# Patient Record
Sex: FEMALE | Race: WHITE | NOT HISPANIC OR LATINO
[De-identification: names, ages, dates, MRNs, and addresses within clinical notes are randomized per-mention and may not be internally consistent; named-entity substitution may affect disease eponyms.]

---

## 2017-05-30 ENCOUNTER — APPOINTMENT (OUTPATIENT)
Dept: ORTHOPEDIC SURGERY | Facility: CLINIC | Age: 72
End: 2017-05-30

## 2017-05-30 VITALS
BODY MASS INDEX: 32.79 KG/M2 | HEART RATE: 75 BPM | DIASTOLIC BLOOD PRESSURE: 80 MMHG | SYSTOLIC BLOOD PRESSURE: 143 MMHG | WEIGHT: 167 LBS | HEIGHT: 60 IN

## 2017-05-30 DIAGNOSIS — M17.9 OSTEOARTHRITIS OF KNEE, UNSPECIFIED: ICD-10-CM

## 2017-06-20 ENCOUNTER — OUTPATIENT (OUTPATIENT)
Dept: OUTPATIENT SERVICES | Facility: HOSPITAL | Age: 72
LOS: 1 days | End: 2017-06-20
Payer: MEDICARE

## 2017-06-20 VITALS
SYSTOLIC BLOOD PRESSURE: 120 MMHG | DIASTOLIC BLOOD PRESSURE: 80 MMHG | RESPIRATION RATE: 14 BRPM | HEART RATE: 74 BPM | HEIGHT: 59 IN | WEIGHT: 165.35 LBS | TEMPERATURE: 97 F | OXYGEN SATURATION: 98 %

## 2017-06-20 DIAGNOSIS — Z01.818 ENCOUNTER FOR OTHER PREPROCEDURAL EXAMINATION: ICD-10-CM

## 2017-06-20 DIAGNOSIS — Z98.890 OTHER SPECIFIED POSTPROCEDURAL STATES: Chronic | ICD-10-CM

## 2017-06-20 DIAGNOSIS — M19.90 UNSPECIFIED OSTEOARTHRITIS, UNSPECIFIED SITE: ICD-10-CM

## 2017-06-20 DIAGNOSIS — M17.11 UNILATERAL PRIMARY OSTEOARTHRITIS, RIGHT KNEE: ICD-10-CM

## 2017-06-20 DIAGNOSIS — Z96.659 PRESENCE OF UNSPECIFIED ARTIFICIAL KNEE JOINT: Chronic | ICD-10-CM

## 2017-06-20 DIAGNOSIS — E89.2 POSTPROCEDURAL HYPOPARATHYROIDISM: Chronic | ICD-10-CM

## 2017-06-20 LAB
ALBUMIN SERPL ELPH-MCNC: 4 G/DL — SIGNIFICANT CHANGE UP (ref 3.3–5)
ALLERGY+IMMUNOLOGY DIAG STUDY NOTE: SIGNIFICANT CHANGE UP
ALP SERPL-CCNC: 84 U/L — SIGNIFICANT CHANGE UP (ref 30–120)
ALT FLD-CCNC: 37 U/L DA — SIGNIFICANT CHANGE UP (ref 10–60)
ANION GAP SERPL CALC-SCNC: 5 MMOL/L — SIGNIFICANT CHANGE UP (ref 5–17)
APTT BLD: 31.4 SEC — SIGNIFICANT CHANGE UP (ref 27.5–37.4)
AST SERPL-CCNC: 27 U/L — SIGNIFICANT CHANGE UP (ref 10–40)
BILIRUB SERPL-MCNC: 0.6 MG/DL — SIGNIFICANT CHANGE UP (ref 0.2–1.2)
BUN SERPL-MCNC: 22 MG/DL — SIGNIFICANT CHANGE UP (ref 7–23)
CALCIUM SERPL-MCNC: 9.7 MG/DL — SIGNIFICANT CHANGE UP (ref 8.4–10.5)
CHLORIDE SERPL-SCNC: 103 MMOL/L — SIGNIFICANT CHANGE UP (ref 96–108)
CO2 SERPL-SCNC: 30 MMOL/L — SIGNIFICANT CHANGE UP (ref 22–31)
CREAT SERPL-MCNC: 0.83 MG/DL — SIGNIFICANT CHANGE UP (ref 0.5–1.3)
GLUCOSE SERPL-MCNC: 107 MG/DL — HIGH (ref 70–99)
HCT VFR BLD CALC: 42 % — SIGNIFICANT CHANGE UP (ref 34.5–45)
HGB BLD-MCNC: 14.5 G/DL — SIGNIFICANT CHANGE UP (ref 11.5–15.5)
INR BLD: 0.98 RATIO — SIGNIFICANT CHANGE UP (ref 0.88–1.16)
MCHC RBC-ENTMCNC: 30.8 PG — SIGNIFICANT CHANGE UP (ref 27–34)
MCHC RBC-ENTMCNC: 34.6 GM/DL — SIGNIFICANT CHANGE UP (ref 32–36)
MCV RBC AUTO: 89 FL — SIGNIFICANT CHANGE UP (ref 80–100)
PLATELET # BLD AUTO: 252 K/UL — SIGNIFICANT CHANGE UP (ref 150–400)
POTASSIUM SERPL-MCNC: 5.4 MMOL/L — HIGH (ref 3.5–5.3)
POTASSIUM SERPL-SCNC: 5.4 MMOL/L — HIGH (ref 3.5–5.3)
PROT SERPL-MCNC: 7.7 G/DL — SIGNIFICANT CHANGE UP (ref 6–8.3)
PROTHROM AB SERPL-ACNC: 10.7 SEC — SIGNIFICANT CHANGE UP (ref 9.8–12.7)
RBC # BLD: 4.72 M/UL — SIGNIFICANT CHANGE UP (ref 3.8–5.2)
RBC # FLD: 13.1 % — SIGNIFICANT CHANGE UP (ref 10.3–14.5)
SODIUM SERPL-SCNC: 138 MMOL/L — SIGNIFICANT CHANGE UP (ref 135–145)
WBC # BLD: 8.5 K/UL — SIGNIFICANT CHANGE UP (ref 3.8–10.5)
WBC # FLD AUTO: 8.5 K/UL — SIGNIFICANT CHANGE UP (ref 3.8–10.5)

## 2017-06-20 PROCEDURE — 93010 ELECTROCARDIOGRAM REPORT: CPT | Mod: NC

## 2017-06-20 NOTE — H&P PST ADULT - FAMILY HISTORY
Mother  Still living? No  Family history of pulmonary embolism, Age at diagnosis: Age Unknown Mother  Still living? No  Family history of pulmonary embolism, Age at diagnosis: Age Unknown     Sibling  Still living? No  Family history of multiple myeloma, Age at diagnosis: Age Unknown     Father  Still living? No  Family history of cancer in father, Age at diagnosis: Age Unknown

## 2017-06-20 NOTE — H&P PST ADULT - PSH
S/P knee replacement  left knee 2012  S/P parathyroidectomy  2005 History of bunionectomy of right great toe  2007  S/P carpal tunnel release  left hand 2006  S/P knee replacement  left knee 2012  S/P parathyroidectomy  2005

## 2017-06-20 NOTE — H&P PST ADULT - HISTORY OF PRESENT ILLNESS
This is a 72 y/o female who presents with complaint of progressively worsening right knee pain x several years .Reports intermittent pain and worse pain 9/10 when walking, getting up from sitting position , standing . Takes Motrin PRN with mild relief . scheduled for right knee replacement

## 2017-06-20 NOTE — H&P PST ADULT - NSANTHOSAYNRD_GEN_A_CORE
No. TANK screening performed.  STOP BANG Legend: 0-2 = LOW Risk; 3-4 = INTERMEDIATE Risk; 5-8 = HIGH Risk

## 2017-06-21 LAB
MRSA PCR RESULT.: SIGNIFICANT CHANGE UP
S AUREUS DNA NOSE QL NAA+PROBE: SIGNIFICANT CHANGE UP

## 2017-07-07 RX ORDER — SENNA PLUS 8.6 MG/1
2 TABLET ORAL AT BEDTIME
Qty: 0 | Refills: 0 | Status: DISCONTINUED | OUTPATIENT
Start: 2017-07-10 | End: 2017-07-12

## 2017-07-07 RX ORDER — ONDANSETRON 8 MG/1
4 TABLET, FILM COATED ORAL EVERY 6 HOURS
Qty: 0 | Refills: 0 | Status: DISCONTINUED | OUTPATIENT
Start: 2017-07-10 | End: 2017-07-12

## 2017-07-07 RX ORDER — CELECOXIB 200 MG/1
200 CAPSULE ORAL ONCE
Qty: 0 | Refills: 0 | Status: COMPLETED | OUTPATIENT
Start: 2017-07-10 | End: 2017-07-10

## 2017-07-07 RX ORDER — MAGNESIUM HYDROXIDE 400 MG/1
30 TABLET, CHEWABLE ORAL DAILY
Qty: 0 | Refills: 0 | Status: DISCONTINUED | OUTPATIENT
Start: 2017-07-10 | End: 2017-07-12

## 2017-07-07 RX ORDER — SODIUM CHLORIDE 9 MG/ML
1000 INJECTION, SOLUTION INTRAVENOUS
Qty: 0 | Refills: 0 | Status: DISCONTINUED | OUTPATIENT
Start: 2017-07-10 | End: 2017-07-11

## 2017-07-07 RX ORDER — DOCUSATE SODIUM 100 MG
100 CAPSULE ORAL THREE TIMES A DAY
Qty: 0 | Refills: 0 | Status: DISCONTINUED | OUTPATIENT
Start: 2017-07-10 | End: 2017-07-12

## 2017-07-07 RX ORDER — PANTOPRAZOLE SODIUM 20 MG/1
40 TABLET, DELAYED RELEASE ORAL
Qty: 0 | Refills: 0 | Status: DISCONTINUED | OUTPATIENT
Start: 2017-07-10 | End: 2017-07-12

## 2017-07-07 RX ORDER — POLYETHYLENE GLYCOL 3350 17 G/17G
17 POWDER, FOR SOLUTION ORAL DAILY
Qty: 0 | Refills: 0 | Status: DISCONTINUED | OUTPATIENT
Start: 2017-07-10 | End: 2017-07-12

## 2017-07-07 NOTE — PATIENT PROFILE ADULT. - PSH
History of bunionectomy of right great toe  2007  S/P carpal tunnel release  left hand 2006  S/P knee replacement  left knee 2012  S/P parathyroidectomy  2005 History of bunionectomy of right great toe  2007  S/P carpal tunnel release  left hand 2006,right hand 2007  S/P knee replacement  left knee 2012  S/P parathyroidectomy  2005

## 2017-07-07 NOTE — PATIENT PROFILE ADULT. - FAMILY HISTORY
Mother  Still living? No  Family history of pulmonary embolism, Age at diagnosis: Age Unknown     Sibling  Still living? No  Family history of multiple myeloma, Age at diagnosis: Age Unknown     Father  Still living? No  Family history of cancer in father, Age at diagnosis: Age Unknown

## 2017-07-09 RX ORDER — FAMOTIDINE 10 MG/ML
1 INJECTION INTRAVENOUS
Qty: 0 | Refills: 0 | COMMUNITY
Start: 2017-07-09 | End: 2017-07-10

## 2017-07-10 ENCOUNTER — RESULT REVIEW (OUTPATIENT)
Age: 72
End: 2017-07-10

## 2017-07-10 ENCOUNTER — APPOINTMENT (OUTPATIENT)
Dept: ORTHOPEDIC SURGERY | Facility: HOSPITAL | Age: 72
End: 2017-07-10

## 2017-07-10 ENCOUNTER — INPATIENT (INPATIENT)
Facility: HOSPITAL | Age: 72
LOS: 1 days | Discharge: HOME | DRG: 470 | End: 2017-07-12
Attending: ORTHOPAEDIC SURGERY | Admitting: ORTHOPAEDIC SURGERY
Payer: MEDICARE

## 2017-07-10 VITALS
OXYGEN SATURATION: 100 % | HEART RATE: 77 BPM | DIASTOLIC BLOOD PRESSURE: 65 MMHG | WEIGHT: 166.67 LBS | RESPIRATION RATE: 15 BRPM | TEMPERATURE: 98 F | SYSTOLIC BLOOD PRESSURE: 138 MMHG | HEIGHT: 60 IN

## 2017-07-10 DIAGNOSIS — Z96.659 PRESENCE OF UNSPECIFIED ARTIFICIAL KNEE JOINT: Chronic | ICD-10-CM

## 2017-07-10 DIAGNOSIS — M17.11 UNILATERAL PRIMARY OSTEOARTHRITIS, RIGHT KNEE: ICD-10-CM

## 2017-07-10 DIAGNOSIS — E78.5 HYPERLIPIDEMIA, UNSPECIFIED: ICD-10-CM

## 2017-07-10 DIAGNOSIS — Z98.890 OTHER SPECIFIED POSTPROCEDURAL STATES: Chronic | ICD-10-CM

## 2017-07-10 DIAGNOSIS — I10 ESSENTIAL (PRIMARY) HYPERTENSION: ICD-10-CM

## 2017-07-10 DIAGNOSIS — E89.2 POSTPROCEDURAL HYPOPARATHYROIDISM: Chronic | ICD-10-CM

## 2017-07-10 LAB
ABO RH CONFIRMATION: SIGNIFICANT CHANGE UP
ANION GAP SERPL CALC-SCNC: 7 MMOL/L — SIGNIFICANT CHANGE UP (ref 5–17)
BUN SERPL-MCNC: 16 MG/DL — SIGNIFICANT CHANGE UP (ref 7–23)
CALCIUM SERPL-MCNC: 8.5 MG/DL — SIGNIFICANT CHANGE UP (ref 8.4–10.5)
CHLORIDE SERPL-SCNC: 105 MMOL/L — SIGNIFICANT CHANGE UP (ref 96–108)
CO2 SERPL-SCNC: 26 MMOL/L — SIGNIFICANT CHANGE UP (ref 22–31)
CREAT SERPL-MCNC: 0.94 MG/DL — SIGNIFICANT CHANGE UP (ref 0.5–1.3)
GLUCOSE SERPL-MCNC: 147 MG/DL — HIGH (ref 70–99)
HCT VFR BLD CALC: 35 % — SIGNIFICANT CHANGE UP (ref 34.5–45)
HGB BLD-MCNC: 12 G/DL — SIGNIFICANT CHANGE UP (ref 11.5–15.5)
POTASSIUM SERPL-MCNC: 4.1 MMOL/L — SIGNIFICANT CHANGE UP (ref 3.5–5.3)
POTASSIUM SERPL-SCNC: 4.1 MMOL/L — SIGNIFICANT CHANGE UP (ref 3.5–5.3)
SODIUM SERPL-SCNC: 138 MMOL/L — SIGNIFICANT CHANGE UP (ref 135–145)

## 2017-07-10 PROCEDURE — 85027 COMPLETE CBC AUTOMATED: CPT

## 2017-07-10 PROCEDURE — 86850 RBC ANTIBODY SCREEN: CPT

## 2017-07-10 PROCEDURE — 99223 1ST HOSP IP/OBS HIGH 75: CPT

## 2017-07-10 PROCEDURE — 36415 COLL VENOUS BLD VENIPUNCTURE: CPT

## 2017-07-10 PROCEDURE — 80053 COMPREHEN METABOLIC PANEL: CPT

## 2017-07-10 PROCEDURE — 73562 X-RAY EXAM OF KNEE 3: CPT | Mod: 26,RT

## 2017-07-10 PROCEDURE — 88311 DECALCIFY TISSUE: CPT | Mod: 26

## 2017-07-10 PROCEDURE — 88305 TISSUE EXAM BY PATHOLOGIST: CPT | Mod: 26

## 2017-07-10 PROCEDURE — 27447 TOTAL KNEE ARTHROPLASTY: CPT | Mod: 82,RT

## 2017-07-10 PROCEDURE — 27447 TOTAL KNEE ARTHROPLASTY: CPT | Mod: RT

## 2017-07-10 PROCEDURE — 85730 THROMBOPLASTIN TIME PARTIAL: CPT

## 2017-07-10 PROCEDURE — 87641 MR-STAPH DNA AMP PROBE: CPT

## 2017-07-10 PROCEDURE — 86900 BLOOD TYPING SEROLOGIC ABO: CPT

## 2017-07-10 PROCEDURE — 86901 BLOOD TYPING SEROLOGIC RH(D): CPT

## 2017-07-10 PROCEDURE — 87640 STAPH A DNA AMP PROBE: CPT

## 2017-07-10 PROCEDURE — 93005 ELECTROCARDIOGRAM TRACING: CPT

## 2017-07-10 PROCEDURE — 85610 PROTHROMBIN TIME: CPT

## 2017-07-10 PROCEDURE — G0463: CPT

## 2017-07-10 RX ORDER — APREPITANT 80 MG/1
40 CAPSULE ORAL ONCE
Qty: 0 | Refills: 0 | Status: COMPLETED | OUTPATIENT
Start: 2017-07-10 | End: 2017-07-10

## 2017-07-10 RX ORDER — SIMVASTATIN 20 MG/1
80 TABLET, FILM COATED ORAL AT BEDTIME
Qty: 0 | Refills: 0 | Status: DISCONTINUED | OUTPATIENT
Start: 2017-07-10 | End: 2017-07-12

## 2017-07-10 RX ORDER — ASPIRIN/CALCIUM CARB/MAGNESIUM 324 MG
162 TABLET ORAL EVERY 12 HOURS
Qty: 0 | Refills: 0 | Status: DISCONTINUED | OUTPATIENT
Start: 2017-07-11 | End: 2017-07-12

## 2017-07-10 RX ORDER — LISINOPRIL 2.5 MG/1
40 TABLET ORAL DAILY
Qty: 0 | Refills: 0 | Status: DISCONTINUED | OUTPATIENT
Start: 2017-07-12 | End: 2017-07-12

## 2017-07-10 RX ORDER — TRANEXAMIC ACID 100 MG/ML
1000 INJECTION, SOLUTION INTRAVENOUS ONCE
Qty: 0 | Refills: 0 | Status: COMPLETED | OUTPATIENT
Start: 2017-07-10 | End: 2017-07-10

## 2017-07-10 RX ORDER — TRAMADOL HYDROCHLORIDE 50 MG/1
50 TABLET ORAL EVERY 4 HOURS
Qty: 0 | Refills: 0 | Status: DISCONTINUED | OUTPATIENT
Start: 2017-07-10 | End: 2017-07-12

## 2017-07-10 RX ORDER — ACETAMINOPHEN 500 MG
1000 TABLET ORAL EVERY 6 HOURS
Qty: 0 | Refills: 0 | Status: COMPLETED | OUTPATIENT
Start: 2017-07-10 | End: 2017-07-11

## 2017-07-10 RX ORDER — HYDROMORPHONE HYDROCHLORIDE 2 MG/ML
0.5 INJECTION INTRAMUSCULAR; INTRAVENOUS; SUBCUTANEOUS
Qty: 0 | Refills: 0 | Status: DISCONTINUED | OUTPATIENT
Start: 2017-07-10 | End: 2017-07-12

## 2017-07-10 RX ORDER — SIMVASTATIN 20 MG/1
80 TABLET, FILM COATED ORAL AT BEDTIME
Qty: 0 | Refills: 0 | Status: DISCONTINUED | OUTPATIENT
Start: 2017-07-10 | End: 2017-07-10

## 2017-07-10 RX ORDER — CEFAZOLIN SODIUM 1 G
2000 VIAL (EA) INJECTION EVERY 8 HOURS
Qty: 0 | Refills: 0 | Status: COMPLETED | OUTPATIENT
Start: 2017-07-10 | End: 2017-07-11

## 2017-07-10 RX ORDER — SODIUM CHLORIDE 9 MG/ML
1000 INJECTION, SOLUTION INTRAVENOUS
Qty: 0 | Refills: 0 | Status: DISCONTINUED | OUTPATIENT
Start: 2017-07-10 | End: 2017-07-10

## 2017-07-10 RX ORDER — HYDROMORPHONE HYDROCHLORIDE 2 MG/ML
0.5 INJECTION INTRAMUSCULAR; INTRAVENOUS; SUBCUTANEOUS
Qty: 0 | Refills: 0 | Status: DISCONTINUED | OUTPATIENT
Start: 2017-07-10 | End: 2017-07-10

## 2017-07-10 RX ORDER — ONDANSETRON 8 MG/1
4 TABLET, FILM COATED ORAL ONCE
Qty: 0 | Refills: 0 | Status: DISCONTINUED | OUTPATIENT
Start: 2017-07-10 | End: 2017-07-10

## 2017-07-10 RX ORDER — CELECOXIB 200 MG/1
200 CAPSULE ORAL
Qty: 0 | Refills: 0 | Status: DISCONTINUED | OUTPATIENT
Start: 2017-07-10 | End: 2017-07-12

## 2017-07-10 RX ORDER — TRAMADOL HYDROCHLORIDE 50 MG/1
100 TABLET ORAL EVERY 6 HOURS
Qty: 0 | Refills: 0 | Status: DISCONTINUED | OUTPATIENT
Start: 2017-07-10 | End: 2017-07-12

## 2017-07-10 RX ORDER — CEFAZOLIN SODIUM 1 G
2000 VIAL (EA) INJECTION ONCE
Qty: 0 | Refills: 0 | Status: COMPLETED | OUTPATIENT
Start: 2017-07-10 | End: 2017-07-10

## 2017-07-10 RX ORDER — ACETAMINOPHEN 500 MG
1000 TABLET ORAL EVERY 8 HOURS
Qty: 0 | Refills: 0 | Status: DISCONTINUED | OUTPATIENT
Start: 2017-07-11 | End: 2017-07-12

## 2017-07-10 RX ORDER — ACETAMINOPHEN 500 MG
1000 TABLET ORAL ONCE
Qty: 0 | Refills: 0 | Status: COMPLETED | OUTPATIENT
Start: 2017-07-10 | End: 2017-07-10

## 2017-07-10 RX ADMIN — Medication 1000 MILLIGRAM(S): at 20:45

## 2017-07-10 RX ADMIN — CELECOXIB 200 MILLIGRAM(S): 200 CAPSULE ORAL at 12:14

## 2017-07-10 RX ADMIN — HYDROMORPHONE HYDROCHLORIDE 0.5 MILLIGRAM(S): 2 INJECTION INTRAMUSCULAR; INTRAVENOUS; SUBCUTANEOUS at 17:20

## 2017-07-10 RX ADMIN — HYDROMORPHONE HYDROCHLORIDE 0.5 MILLIGRAM(S): 2 INJECTION INTRAMUSCULAR; INTRAVENOUS; SUBCUTANEOUS at 17:50

## 2017-07-10 RX ADMIN — APREPITANT 40 MILLIGRAM(S): 80 CAPSULE ORAL at 12:15

## 2017-07-10 RX ADMIN — Medication 100 MILLIGRAM(S): at 21:36

## 2017-07-10 RX ADMIN — SODIUM CHLORIDE 100 MILLILITER(S): 9 INJECTION, SOLUTION INTRAVENOUS at 15:59

## 2017-07-10 RX ADMIN — Medication 400 MILLIGRAM(S): at 20:00

## 2017-07-10 RX ADMIN — Medication 100 MILLIGRAM(S): at 21:50

## 2017-07-10 RX ADMIN — SENNA PLUS 2 TABLET(S): 8.6 TABLET ORAL at 21:51

## 2017-07-10 RX ADMIN — SIMVASTATIN 80 MILLIGRAM(S): 20 TABLET, FILM COATED ORAL at 21:51

## 2017-07-10 NOTE — CONSULT NOTE ADULT - SUBJECTIVE AND OBJECTIVE BOX
Patient is 70 yo female with hx of HTN, and HLD presenting S/P right TKR.  Pain well controlled.  Offers no other complaints      Constitutional: No fever, fatigue or weight loss.  Skin: No rash.  Eyes: No recent vision problems or eye pain.  ENT: No congestion, ear pain, or sore throat.  Endocrine: No thyroid problems.  Cardiovascular: No chest pain or palpation.  Respiratory: No cough, shortness of breath, congestion, or wheezing.  Gastrointestinal: No abdominal pain, nausea, vomiting, or diarrhea.  Genitourinary: No dysuria.  Musculoskeletal: No joint swelling.  Neurologic: No headache.      Allergies: No Known Allergies:     Past Medical History:  HLD (hyperlipidemia)    HTN (hypertension)    Osteoarthritis    Sciatica.    Past Surgical History:  History of bunionectomy of right great toe  2007  S/P carpal tunnel release  left hand 2006,right hand 2007  S/P knee replacement  left knee 2012  S/P parathyroidectomy  2005.    Home Medications:   * Patient Currently Takes Medications as of 10-Jul-2017 11:12 documented in Prescription Writer  · 	Pepcid AC Maximum Strength 20 mg oral tablet: Last Dose Taken: 10-Jul-2017 8:00 AM, 1 tab(s) orally 2 times a day(x2 preoperatively )  · 	simvastatin 80 mg oral tablet: Last Dose Taken: 09-Jul-2017 9:00 PM, 1 tab(s) orally once a day (at bedtime)  · 	hydroCHLOROthiazide 12.5 mg oral capsule: Last Dose Taken: 03-Jul-2017 , 1 cap(s) orally once a day  · 	lisinopril 40 mg oral tablet: Last Dose Taken: 09-Jul-2017 9:00 PM, 1 tab(s) orally once a day  · 	Calcium 600+D oral tablet: Last Dose Taken: 03-Jul-2017 ,  orally once a day  · 	aspirin 81 mg oral tablet: Last Dose Taken: 03-Jul-2017 ,  orally         Vital Signs Last 24 Hrs  T(C): 36.6 (10 Jul 2017 15:20), Max: 36.6 (10 Jul 2017 15:20)  T(F): 97.8 (10 Jul 2017 15:20), Max: 97.8 (10 Jul 2017 15:20)  HR: 66 (10 Jul 2017 17:50) (55 - 77)  BP: 105/65 (10 Jul 2017 17:50) (105/65 - 143/63)  BP(mean): --  RR: 15 (10 Jul 2017 17:50) (12 - 18)  SpO2: 100% (10 Jul 2017 17:50) (99% - 100%)      PHYSICAL EXAM-  GENERAL: NAD, well-groomed, well-developed  HEAD:  Atraumatic, Normocephalic  EYES: EOMI, PERRLA, conjunctiva and sclera clear  NECK: Supple, No JVD, Normal thyroid  NERVOUS SYSTEM:  Alert & Oriented X3, Motor Strength 5/5 B/L upper and lower extremities; DTRs 2+ intact and symmetric  CHEST/LUNG: Clear to percussion bilaterally; No rales, rhonchi, wheezing, or rubs  HEART: Regular rate and rhythm; No murmurs, rubs, or gallops  ABDOMEN: Soft, Nontender, Nondistended; Bowel sounds present  EXTREMITIES:  2+ Peripheral Pulses, No clubbing, cyanosis, or edema  SKIN: No rashes or lesions

## 2017-07-10 NOTE — CONSULT NOTE ADULT - ATTENDING COMMENTS
Plan of care was discussed with patient, and  in great details, All questions were answered to their satisfaction.  Seems to understand, and in agreement

## 2017-07-11 ENCOUNTER — TRANSCRIPTION ENCOUNTER (OUTPATIENT)
Age: 72
End: 2017-07-11

## 2017-07-11 LAB
ANION GAP SERPL CALC-SCNC: 7 MMOL/L — SIGNIFICANT CHANGE UP (ref 5–17)
BUN SERPL-MCNC: 15 MG/DL — SIGNIFICANT CHANGE UP (ref 7–23)
CALCIUM SERPL-MCNC: 8.8 MG/DL — SIGNIFICANT CHANGE UP (ref 8.4–10.5)
CHLORIDE SERPL-SCNC: 107 MMOL/L — SIGNIFICANT CHANGE UP (ref 96–108)
CO2 SERPL-SCNC: 26 MMOL/L — SIGNIFICANT CHANGE UP (ref 22–31)
CREAT SERPL-MCNC: 0.98 MG/DL — SIGNIFICANT CHANGE UP (ref 0.5–1.3)
GLUCOSE SERPL-MCNC: 123 MG/DL — HIGH (ref 70–99)
HCT VFR BLD CALC: 35.2 % — SIGNIFICANT CHANGE UP (ref 34.5–45)
HGB BLD-MCNC: 11.8 G/DL — SIGNIFICANT CHANGE UP (ref 11.5–15.5)
MCHC RBC-ENTMCNC: 30.2 PG — SIGNIFICANT CHANGE UP (ref 27–34)
MCHC RBC-ENTMCNC: 33.5 GM/DL — SIGNIFICANT CHANGE UP (ref 32–36)
MCV RBC AUTO: 90.3 FL — SIGNIFICANT CHANGE UP (ref 80–100)
PLATELET # BLD AUTO: 202 K/UL — SIGNIFICANT CHANGE UP (ref 150–400)
POTASSIUM SERPL-MCNC: 5.2 MMOL/L — SIGNIFICANT CHANGE UP (ref 3.5–5.3)
POTASSIUM SERPL-SCNC: 5.2 MMOL/L — SIGNIFICANT CHANGE UP (ref 3.5–5.3)
RBC # BLD: 3.89 M/UL — SIGNIFICANT CHANGE UP (ref 3.8–5.2)
RBC # FLD: 12.9 % — SIGNIFICANT CHANGE UP (ref 10.3–14.5)
SODIUM SERPL-SCNC: 140 MMOL/L — SIGNIFICANT CHANGE UP (ref 135–145)
WBC # BLD: 11 K/UL — HIGH (ref 3.8–10.5)
WBC # FLD AUTO: 11 K/UL — HIGH (ref 3.8–10.5)

## 2017-07-11 PROCEDURE — 99232 SBSQ HOSP IP/OBS MODERATE 35: CPT

## 2017-07-11 RX ORDER — CELECOXIB 200 MG/1
1 CAPSULE ORAL
Qty: 38 | Refills: 0 | OUTPATIENT
Start: 2017-07-11 | End: 2017-07-30

## 2017-07-11 RX ADMIN — Medication 162 MILLIGRAM(S): at 21:41

## 2017-07-11 RX ADMIN — Medication 100 MILLIGRAM(S): at 05:35

## 2017-07-11 RX ADMIN — Medication 1000 MILLIGRAM(S): at 09:06

## 2017-07-11 RX ADMIN — CELECOXIB 200 MILLIGRAM(S): 200 CAPSULE ORAL at 09:06

## 2017-07-11 RX ADMIN — SENNA PLUS 2 TABLET(S): 8.6 TABLET ORAL at 21:41

## 2017-07-11 RX ADMIN — CELECOXIB 200 MILLIGRAM(S): 200 CAPSULE ORAL at 15:33

## 2017-07-11 RX ADMIN — PANTOPRAZOLE SODIUM 40 MILLIGRAM(S): 20 TABLET, DELAYED RELEASE ORAL at 05:35

## 2017-07-11 RX ADMIN — SIMVASTATIN 80 MILLIGRAM(S): 20 TABLET, FILM COATED ORAL at 21:41

## 2017-07-11 RX ADMIN — Medication 400 MILLIGRAM(S): at 02:12

## 2017-07-11 RX ADMIN — Medication 1000 MILLIGRAM(S): at 21:41

## 2017-07-11 RX ADMIN — CELECOXIB 200 MILLIGRAM(S): 200 CAPSULE ORAL at 09:05

## 2017-07-11 RX ADMIN — CELECOXIB 200 MILLIGRAM(S): 200 CAPSULE ORAL at 15:32

## 2017-07-11 RX ADMIN — Medication 100 MILLIGRAM(S): at 21:41

## 2017-07-11 RX ADMIN — Medication 162 MILLIGRAM(S): at 09:04

## 2017-07-11 RX ADMIN — Medication 1000 MILLIGRAM(S): at 12:59

## 2017-07-11 RX ADMIN — Medication 1000 MILLIGRAM(S): at 03:02

## 2017-07-11 RX ADMIN — Medication 100 MILLIGRAM(S): at 13:00

## 2017-07-11 RX ADMIN — Medication 400 MILLIGRAM(S): at 09:05

## 2017-07-11 NOTE — DISCHARGE NOTE ADULT - NS AS ACTIVITY OBS
Do not drive or operate machinery/Walking-Indoors allowed/Walking-Outdoors allowed/Stairs allowed/No Heavy lifting/straining/Showering allowed

## 2017-07-11 NOTE — DISCHARGE NOTE ADULT - PATIENT PORTAL LINK FT
“You can access the [facility] Patient Portal, offered by [facility], by registering with the following website: [email or web address]”

## 2017-07-11 NOTE — DISCHARGE NOTE ADULT - MEDICATION SUMMARY - MEDICATIONS TO STOP TAKING
I will STOP taking the medications listed below when I get home from the hospital:    Pepcid AC Maximum Strength 20 mg oral tablet  -- 1 tab(s) by mouth 2 times a day(x2 preoperatively )

## 2017-07-11 NOTE — OCCUPATIONAL THERAPY INITIAL EVALUATION ADULT - ORIENTATION, REHAB EVAL
Patient educated verbally regarding LE weight bearing status & role of OT. Pt. provided with education folder including functional exercises, TKR education & caregiver guide pamphlet./oriented to person, place, time and situation

## 2017-07-11 NOTE — PROGRESS NOTE ADULT - SUBJECTIVE AND OBJECTIVE BOX
Post Op Day # 1    SUBJECTIVE    70yo Female status post right TKR .   Patient is alert and comfortable.    Pain is controlled with current pain regimen.  Denies nausea, vomiting, chest pain, shortness of breath, abdominal pain or fever.   No new complaints.    OBJECTIVE    Vital Signs Last 24 Hrs  T(C): 36.7 (11 Jul 2017 07:53), Max: 36.7 (11 Jul 2017 07:53)  T(F): 98 (11 Jul 2017 07:53), Max: 98 (11 Jul 2017 07:53)  HR: 60 (11 Jul 2017 07:53) (55 - 77)  BP: 116/68 (11 Jul 2017 07:53) (98/56 - 143/63)  BP(mean): --  RR: 16 (11 Jul 2017 07:53) (12 - 18)  SpO2: 100% (11 Jul 2017 07:53) (99% - 100%)  I&O's Summary    10 Jul 2017 07:01  -  11 Jul 2017 07:00  --------------------------------------------------------  IN: 1400 mL / OUT: 1250 mL / NET: 150 mL        PHYSICAL EXAM    Right knee dressing is clean, dry and intact.   The calf is supple/nontender.   Passive range of motion is acceptable to due postoperative pain.   Sensation to light touch is grossly intact distally.   The lateral cutaneous nerve is intact.   Motor function distally is intact.   No foot drop.   (2+) dorsalis pedis pulse. Capillary refill is less than 2 seconds. No cyanosis.                          11.8   11.0<H> )-----------( 202      ( 11 Jul 2017 07:20 )             35.2   11 Jul 2017 07:20                        12.0   x     )-----------( x        ( 10 Jul 2017 22:54 )             35.0   10 Jul 2017 22:54    11 Jul 2017 07:20    140    |  107    |  15     ----------------------------<  123<H>  5.2     |  26     |  0.98   10 Jul 2017 22:54    138    |  105    |  16     ----------------------------<  147<H>  4.1     |  26     |  0.94     Ca    8.8        11 Jul 2017 07:20  Ca    8.5        10 Jul 2017 22:54        ASSESSMENT AND PLAN  - Orthopedically stable  - DVT prophylaxis: PAS + Ecotrin 162mg PO BID  - Continue physical therapy and occupational therapy  - Weight bearing as tolerated of the right lower extremity with assistance of a walker  - Incentive spirometry encouraged  - Pain control as clinically indicated  - Disposition:  Home

## 2017-07-11 NOTE — OCCUPATIONAL THERAPY INITIAL EVALUATION ADULT - ADDITIONAL COMMENTS
Pt lives in a _______with steps to enter. No steps inside. Pt has a bathtub. Pt owns a Pt lives in a house with 2 steps and a handrail to enter. Bedroom and bathroom main floor, steps to laundry room. Pt has a bathtub with curtain and 2 grab bars. Pt owns a rolling walker and a cane. Pt has a comfort height toilet.  Spouse is retired and can assist patient at home prn.

## 2017-07-11 NOTE — DISCHARGE NOTE ADULT - PROVIDER TOKENS
FREE:[LAST:[Philip],FIRST:[Willie],PHONE:[(   )    -],FAX:[(   )    -],ADDRESS:[[address] [place] [address]  Tel: [phone]  Fax: [phone]]]

## 2017-07-11 NOTE — DISCHARGE NOTE ADULT - HOME CARE AGENCY
[facility] - [phone] or [phone]  Nurse to visit the day after hospital discharge; Physical therapist to follow. Please contact the home care agency at the above phone number if you have not heard from them by 12 noon on the day after your hospital discharge.

## 2017-07-11 NOTE — PROGRESS NOTE ADULT - SUBJECTIVE AND OBJECTIVE BOX
CLOT PREVENTION MEDICATION  Aspirin enteric coated 162mg (2-81mg) twice daily.  PAIN MEDICATION  Acetaminophen 1000mg (Tylenol®)  three times daily.  Celecoxib 200mg twice daily.  Tramadol 50mg & 100 mg   (as needed for pain)  STOMACH PROTECTION and CONSTIPATION MANAGEMENT  Pantoprazole 40mg daily   Docusate 100mg (Colace®) stool softener while taking tramadol  Senna, Bisacodyl, Miralax (as needed for constipation)

## 2017-07-11 NOTE — OCCUPATIONAL THERAPY INITIAL EVALUATION ADULT - ADL RETRAINING, OT EVAL
Patient will dress lower body with minimal assistance, AE as needed in 2-4 sessions. Patient will dress lower body with minimal assistance, AE as needed in 1-3 sessions.

## 2017-07-11 NOTE — OCCUPATIONAL THERAPY INITIAL EVALUATION ADULT - TRANSFER TRAINING, PT EVAL
Patient will transfer to toilet with DME as needed with minimal assistance in 2-4 sessions. Patient will transfer to toilet and tub with DME as needed with supervision in 1-3 sessions.

## 2017-07-11 NOTE — DISCHARGE NOTE ADULT - MEDICATION SUMMARY - MEDICATIONS TO TAKE
I will START or STAY ON the medications listed below when I get home from the hospital:    Rolling walker with 5 inch wheels   -- Dx: s/p right TKR  -- Indication: For equipment    acetaminophen 500 mg oral tablet  -- 2 tab(s) by mouth every 8 hours x 10-14 days  -- Indication: For Pain    traMADol 50 mg oral tablet  -- 1 tab(s) by mouth every 4 hours, As needed,Pain  MDD:6  -- Indication: For Pain    aspirin 81 mg oral delayed release tablet  -- 2 tab(s) by mouth every 12 hours  -- Indication: For DVT prophylaxis    celecoxib 200 mg oral capsule  -- 1 cap(s) by mouth 2 times a day (before meals)  -- Indication: For Pain    lisinopril 40 mg oral tablet  -- 1 tab(s) by mouth once a day  -- Indication: For HTN (hypertension)    simvastatin 80 mg oral tablet  -- 1 tab(s) by mouth once a day (at bedtime)  -- Indication: For HLD (hyperlipidemia)    hydroCHLOROthiazide 12.5 mg oral capsule  -- 1 cap(s) by mouth once a day  -- Indication: For HTN (hypertension)    docusate sodium 100 mg oral capsule  -- 1 cap(s) by mouth 3 times a day, As Needed -for constipation  -- Indication: For constipation    senna oral tablet  -- 2 tab(s) by mouth once a day (at bedtime)  -- Indication: For constipation    pantoprazole 40 mg oral delayed release tablet  -- 1 tab(s) by mouth once a day (before a meal)  -- Indication: For acid reflux    Calcium 600+D oral tablet  --  by mouth once a day  -- Indication: For Supplement

## 2017-07-11 NOTE — DISCHARGE NOTE ADULT - HOSPITAL COURSE
This patient was admitted to [facility] with a history of severe degenerative joint disease of the right knee  Patient went to Pre-Surgical Testing at [facility] and was medically cleared to undergo elective procedure.  No operative or klyer-operative complications arose during patients hospital course.  Patient received antibiotic according to SCIP guidelines for infection prevention.  Ecotrin was given for DVT prophylaxis.  Anesthesia, Medical Hospitalist, Physical Therapy and Occupational Therapy were consulted. Patient is stable for discharge with a good prognosis.  Appropriate discharge instructions and medications are provided in this document. This patient was admitted to [facility] with a history of severe degenerative joint disease of the right knee  Patient went to Pre-Surgical Testing at [facility] and was medically cleared to undergo elective procedure.  Right TKR performed on 7/10/17 by . No operative or kyler-operative complications arose during patients hospital course.  Patient received antibiotic according to SCIP guidelines for infection prevention.  Ecotrin was given for DVT prophylaxis.  Anesthesia, Medical Hospitalist, Physical Therapy and Occupational Therapy were consulted. Patient is stable for discharge with a good prognosis.  Appropriate discharge instructions and medications are provided in this document.

## 2017-07-11 NOTE — OCCUPATIONAL THERAPY INITIAL EVALUATION ADULT - IADL RETRAINING, OT EVAL
Patient will increase standing tolerance to 5-7 minutes for grooming at the sink in 2-4 sessions. Patient will increase standing tolerance to 5-7 minutes for grooming at the sink in 1-3 sessions.

## 2017-07-11 NOTE — OCCUPATIONAL THERAPY INITIAL EVALUATION ADULT - GENERAL OBSERVATIONS, REHAB EVAL
Pt found supine in bed with +IV, PAS, +bandage, supplemental 02 Pt found supine in bed with +IV, PAS, +bandage right LE, supplemental 02.

## 2017-07-11 NOTE — PROGRESS NOTE ADULT - SUBJECTIVE AND OBJECTIVE BOX
Post-Anesthetic Evaluation:    The Patient was interviewed and evaluated    Vital Signs Last 24 Hrs  T(C): 36.7 (11 Jul 2017 07:53), Max: 36.7 (11 Jul 2017 07:53)  T(F): 98 (11 Jul 2017 07:53), Max: 98 (11 Jul 2017 07:53)  HR: 60 (11 Jul 2017 07:53) (55 - 77)  BP: 116/68 (11 Jul 2017 07:53) (98/56 - 143/63)  BP(mean): --  RR: 16 (11 Jul 2017 07:53) (12 - 18)  SpO2: 100% (11 Jul 2017 07:53) (99% - 100%)    Evaluation:      (X) No apparent complications or complaints regarding anesthesia care at this time  (X) All questions were answered    Condition:  (X) Stable      ( ) Guarded      ( ) Critical    Recommendations:  (X) None     ( ) Other:

## 2017-07-11 NOTE — DISCHARGE NOTE ADULT - CARE PROVIDER_API CALL
Willie Palacios  [address] [place] [address],   [address]  [place] [address]  Tel: [phone]  Fax: [phone]  Phone: (   )    -  Fax: (   )    -

## 2017-07-11 NOTE — DISCHARGE NOTE ADULT - INSTRUCTIONS
None None    For Constipation :   • Increase your water intake. Drink at least 8 glasses of water daily.  • Try adding fiber to your diet by eating fruits, vegetables and foods that are rich in grains.  • If you do experience constipation, you may take an over-the-counter stool softener/laxative such as Jil Colace, Sen[place] or  Milk of Magnesia.

## 2017-07-12 VITALS
RESPIRATION RATE: 16 BRPM | OXYGEN SATURATION: 97 % | SYSTOLIC BLOOD PRESSURE: 112 MMHG | TEMPERATURE: 98 F | HEART RATE: 76 BPM | DIASTOLIC BLOOD PRESSURE: 73 MMHG

## 2017-07-12 LAB
ANION GAP SERPL CALC-SCNC: 6 MMOL/L — SIGNIFICANT CHANGE UP (ref 5–17)
BUN SERPL-MCNC: 19 MG/DL — SIGNIFICANT CHANGE UP (ref 7–23)
CALCIUM SERPL-MCNC: 8.5 MG/DL — SIGNIFICANT CHANGE UP (ref 8.4–10.5)
CHLORIDE SERPL-SCNC: 108 MMOL/L — SIGNIFICANT CHANGE UP (ref 96–108)
CO2 SERPL-SCNC: 26 MMOL/L — SIGNIFICANT CHANGE UP (ref 22–31)
CREAT SERPL-MCNC: 0.78 MG/DL — SIGNIFICANT CHANGE UP (ref 0.5–1.3)
GLUCOSE SERPL-MCNC: 90 MG/DL — SIGNIFICANT CHANGE UP (ref 70–99)
HCT VFR BLD CALC: 34 % — LOW (ref 34.5–45)
HGB BLD-MCNC: 12 G/DL — SIGNIFICANT CHANGE UP (ref 11.5–15.5)
MCHC RBC-ENTMCNC: 31.6 PG — SIGNIFICANT CHANGE UP (ref 27–34)
MCHC RBC-ENTMCNC: 35.2 GM/DL — SIGNIFICANT CHANGE UP (ref 32–36)
MCV RBC AUTO: 89.9 FL — SIGNIFICANT CHANGE UP (ref 80–100)
PLATELET # BLD AUTO: 180 K/UL — SIGNIFICANT CHANGE UP (ref 150–400)
POTASSIUM SERPL-MCNC: 4.1 MMOL/L — SIGNIFICANT CHANGE UP (ref 3.5–5.3)
POTASSIUM SERPL-SCNC: 4.1 MMOL/L — SIGNIFICANT CHANGE UP (ref 3.5–5.3)
RBC # BLD: 3.78 M/UL — LOW (ref 3.8–5.2)
RBC # FLD: 13.3 % — SIGNIFICANT CHANGE UP (ref 10.3–14.5)
SODIUM SERPL-SCNC: 140 MMOL/L — SIGNIFICANT CHANGE UP (ref 135–145)
WBC # BLD: 9.5 K/UL — SIGNIFICANT CHANGE UP (ref 3.8–10.5)
WBC # FLD AUTO: 9.5 K/UL — SIGNIFICANT CHANGE UP (ref 3.8–10.5)

## 2017-07-12 PROCEDURE — C1776: CPT

## 2017-07-12 PROCEDURE — 88305 TISSUE EXAM BY PATHOLOGIST: CPT

## 2017-07-12 PROCEDURE — C1713: CPT

## 2017-07-12 PROCEDURE — 97530 THERAPEUTIC ACTIVITIES: CPT

## 2017-07-12 PROCEDURE — 97116 GAIT TRAINING THERAPY: CPT

## 2017-07-12 PROCEDURE — 36415 COLL VENOUS BLD VENIPUNCTURE: CPT

## 2017-07-12 PROCEDURE — C1889: CPT

## 2017-07-12 PROCEDURE — 97110 THERAPEUTIC EXERCISES: CPT

## 2017-07-12 PROCEDURE — 80048 BASIC METABOLIC PNL TOTAL CA: CPT

## 2017-07-12 PROCEDURE — 97535 SELF CARE MNGMENT TRAINING: CPT

## 2017-07-12 PROCEDURE — 88311 DECALCIFY TISSUE: CPT

## 2017-07-12 PROCEDURE — 85027 COMPLETE CBC AUTOMATED: CPT

## 2017-07-12 PROCEDURE — 85018 HEMOGLOBIN: CPT

## 2017-07-12 PROCEDURE — 97165 OT EVAL LOW COMPLEX 30 MIN: CPT

## 2017-07-12 PROCEDURE — 97161 PT EVAL LOW COMPLEX 20 MIN: CPT

## 2017-07-12 PROCEDURE — 99238 HOSP IP/OBS DSCHRG MGMT 30/<: CPT

## 2017-07-12 PROCEDURE — 73562 X-RAY EXAM OF KNEE 3: CPT

## 2017-07-12 RX ORDER — ASPIRIN/CALCIUM CARB/MAGNESIUM 324 MG
2 TABLET ORAL
Qty: 160 | Refills: 0 | OUTPATIENT
Start: 2017-07-12 | End: 2017-08-21

## 2017-07-12 RX ORDER — ASPIRIN/CALCIUM CARB/MAGNESIUM 324 MG
0 TABLET ORAL
Qty: 0 | Refills: 0 | COMMUNITY

## 2017-07-12 RX ORDER — ACETAMINOPHEN 500 MG
2 TABLET ORAL
Qty: 0 | Refills: 0 | COMMUNITY
Start: 2017-07-12

## 2017-07-12 RX ORDER — PANTOPRAZOLE SODIUM 20 MG/1
1 TABLET, DELAYED RELEASE ORAL
Qty: 30 | Refills: 1 | OUTPATIENT
Start: 2017-07-12 | End: 2017-09-09

## 2017-07-12 RX ORDER — DOCUSATE SODIUM 100 MG
1 CAPSULE ORAL
Qty: 90 | Refills: 0 | OUTPATIENT
Start: 2017-07-12 | End: 2017-08-11

## 2017-07-12 RX ORDER — SENNA PLUS 8.6 MG/1
2 TABLET ORAL
Qty: 0 | Refills: 0 | COMMUNITY
Start: 2017-07-12

## 2017-07-12 RX ORDER — TRAMADOL HYDROCHLORIDE 50 MG/1
1 TABLET ORAL
Qty: 80 | Refills: 0 | OUTPATIENT
Start: 2017-07-12

## 2017-07-12 RX ORDER — ASPIRIN/CALCIUM CARB/MAGNESIUM 324 MG
1 TABLET ORAL
Qty: 0 | Refills: 0 | COMMUNITY
Start: 2017-07-12 | End: 2017-08-21

## 2017-07-12 RX ADMIN — CELECOXIB 200 MILLIGRAM(S): 200 CAPSULE ORAL at 09:30

## 2017-07-12 RX ADMIN — Medication 100 MILLIGRAM(S): at 13:09

## 2017-07-12 RX ADMIN — Medication 162 MILLIGRAM(S): at 08:53

## 2017-07-12 RX ADMIN — Medication 100 MILLIGRAM(S): at 05:28

## 2017-07-12 RX ADMIN — CELECOXIB 200 MILLIGRAM(S): 200 CAPSULE ORAL at 08:53

## 2017-07-12 RX ADMIN — Medication 1000 MILLIGRAM(S): at 13:09

## 2017-07-12 RX ADMIN — TRAMADOL HYDROCHLORIDE 50 MILLIGRAM(S): 50 TABLET ORAL at 08:52

## 2017-07-12 RX ADMIN — PANTOPRAZOLE SODIUM 40 MILLIGRAM(S): 20 TABLET, DELAYED RELEASE ORAL at 05:28

## 2017-07-12 RX ADMIN — TRAMADOL HYDROCHLORIDE 50 MILLIGRAM(S): 50 TABLET ORAL at 09:30

## 2017-07-12 RX ADMIN — Medication 1000 MILLIGRAM(S): at 05:28

## 2017-07-12 NOTE — PROGRESS NOTE ADULT - SUBJECTIVE AND OBJECTIVE BOX
Orthopedic P.A.- POD# 2- s/p Right TKR    Patient alert and comfortable; walked from bathroom with walker with no c/o pain in knee, but states it feels "stiff".  No BM post-op yet. Denies nausea.  Vital Signs Last 24 Hrs  T(C): 36.6 (12 Jul 2017 03:00), Max: 36.9 (11 Jul 2017 19:00)  T(F): 97.8 (12 Jul 2017 03:00), Max: 98.4 (11 Jul 2017 19:00)  HR: 64 (12 Jul 2017 03:00) (60 - 71)  BP: 136/81 (12 Jul 2017 03:00) (112/80 - 136/81)  BP(mean): --  RR: 16 (12 Jul 2017 03:00) (16 - 16)  SpO2: 96% (12 Jul 2017 03:00) (96% - 100%)         I&O's Detail                            LABS:                                 12.0   9.5   )-----------( 180      ( 12 Jul 2017 07:07 )      BMP in testing****             34.0<L>  12 Jul 2017 07:07                                        MEDICATIONS:aluminum hydroxide/magnesium hydroxide/simethicone Suspension 30 milliLiter(s) Oral four times a day PRN  ondansetron Injectable 4 milliGRAM(s) IV Push every 6 hours PRN  pantoprazole    Tablet 40 milliGRAM(s) Oral before breakfast  magnesium hydroxide Suspension 30 milliLiter(s) Oral daily PRN  polyethylene glycol 3350 17 Gram(s) Oral daily PRN  bisacodyl Suppository 10 milliGRAM(s) Rectal daily PRN  senna 2 Tablet(s) Oral at bedtime  docusate sodium 100 milliGRAM(s) Oral three times a day  lisinopril 40 milliGRAM(s) Oral daily  celecoxib 200 milliGRAM(s) Oral two times a day before meals  aspirin enteric coated 162 milliGRAM(s) Oral every 12 hours  HYDROmorphone  Injectable 0.5 milliGRAM(s) IV Push every 3 hours PRN  traMADol 50 milliGRAM(s) Oral every 4 hours PRN  traMADol 100 milliGRAM(s) Oral every 6 hours PRN  acetaminophen   Tablet 1000 milliGRAM(s) Oral every 8 hours  simvastatin 80 milliGRAM(s) Oral at bedtime    Anticoagulation:  aspirin enteric coated 162 milliGRAM(s) Oral every 12 hours      Pain medications:   ondansetron Injectable 4 milliGRAM(s) IV Push every 6 hours PRN  celecoxib 200 milliGRAM(s) Oral two times a day before meals  HYDROmorphone  Injectable 0.5 milliGRAM(s) IV Push every 3 hours PRN  traMADol 50 milliGRAM(s) Oral every 4 hours PRN  traMADol 100 milliGRAM(s) Oral every 6 hours PRN  acetaminophen   Tablet 1000 milliGRAM(s) Oral every 8 hours                                      Physical Exam:  Right knee- Primary surgical bandage removed and sterile compression bandage placed over dry and intact Prineo tape over surgical wound.  Neurovascular grossly intact LE's.  PAS replaced onto LE's.  Calves soft and non-tender.                                                                                                                                                          A/P:  Orthopedically stable.  -Continue pain management with scheduled Celebrex and acetaminophen and prn Tramadol.  -DVT prophylaxis with Ecotrin 162mg po every 12 hrs for 6 weeks.  -Check BMP today.  -Increase ambulation and ROM Right knee with PT/OT and exercises  -Dr. Delvalle for continued medical care.  for medical care  -Discharge planning for Home today or tomorrow.  -Further plan as per attendings.

## 2017-07-12 NOTE — PROGRESS NOTE ADULT - SUBJECTIVE AND OBJECTIVE BOX
Pain well controlled.  Offers no other complaints.       Constitutional: No fever, fatigue or weight loss.  Skin: No rash.  Eyes: No recent vision problems or eye pain.  ENT: No congestion, ear pain, or sore throat.  Endocrine: No thyroid problems.  Cardiovascular: No chest pain or palpation.  Respiratory: No cough, shortness of breath, congestion, or wheezing.  Gastrointestinal: No abdominal pain, nausea, vomiting, or diarrhea.  Genitourinary: No dysuria.  Musculoskeletal: No joint swelling.  Neurologic: No headache.      MEDICATIONS  (STANDING):  pantoprazole    Tablet 40 milliGRAM(s) Oral before breakfast  senna 2 Tablet(s) Oral at bedtime  docusate sodium 100 milliGRAM(s) Oral three times a day  lisinopril 40 milliGRAM(s) Oral daily  celecoxib 200 milliGRAM(s) Oral two times a day before meals  aspirin enteric coated 162 milliGRAM(s) Oral every 12 hours  acetaminophen   Tablet 1000 milliGRAM(s) Oral every 8 hours  simvastatin 80 milliGRAM(s) Oral at bedtime    MEDICATIONS  (PRN):  aluminum hydroxide/magnesium hydroxide/simethicone Suspension 30 milliLiter(s) Oral four times a day PRN Indigestion  ondansetron Injectable 4 milliGRAM(s) IV Push every 6 hours PRN Nausea and/or Vomiting  magnesium hydroxide Suspension 30 milliLiter(s) Oral daily PRN Constipation  polyethylene glycol 3350 17 Gram(s) Oral daily PRN Constipation  bisacodyl Suppository 10 milliGRAM(s) Rectal daily PRN If no bowel movement by postoperative day #2  HYDROmorphone  Injectable 0.5 milliGRAM(s) IV Push every 3 hours PRN Severe Pain (7 - 10)  traMADol 50 milliGRAM(s) Oral every 4 hours PRN Mild Pain (1 - 3)  traMADol 100 milliGRAM(s) Oral every 6 hours PRN Moderate Pain (4 - 6)      Vital Signs Last 24 Hrs  T(C): 36.5 (12 Jul 2017 07:48), Max: 36.9 (11 Jul 2017 19:00)  T(F): 97.7 (12 Jul 2017 07:48), Max: 98.4 (11 Jul 2017 19:00)  HR: 75 (12 Jul 2017 07:48) (61 - 75)  BP: 118/75 (12 Jul 2017 07:48) (112/80 - 136/81)  BP(mean): --  RR: 16 (12 Jul 2017 07:48) (16 - 16)  SpO2: 99% (12 Jul 2017 07:48) (96% - 100%)        PHYSICAL EXAM-  GENERAL: NAD, well-groomed, well-developed  HEAD:  Atraumatic, Normocephalic  EYES: EOMI, PERRLA, conjunctiva and sclera clear  NECK: Supple, No JVD, Normal thyroid  NERVOUS SYSTEM:  Alert & Oriented X3, Motor Strength 5/5 B/L upper and lower extremities; DTRs 2+ intact and symmetric  CHEST/LUNG: Clear to percussion bilaterally; No rales, rhonchi, wheezing, or rubs  HEART: Regular rate and rhythm; No murmurs, rubs, or gallops  ABDOMEN: Soft, Nontender, Nondistended; Bowel sounds present  EXTREMITIES:  2+ Peripheral Pulses, No clubbing, cyanosis, or edema  SKIN: No rashes or lesions                            12.0   9.5   )-----------( 180      ( 12 Jul 2017 07:07 )             34.0     07-12    140  |  108  |  19  ----------------------------<  90  4.1   |  26  |  0.78    Ca    8.5      12 Jul 2017 07:07

## 2017-07-12 NOTE — PROGRESS NOTE ADULT - PROBLEM SELECTOR PLAN 1
S/P Right TKR.  Continue with pain management, DVT proph, and wound care as per ortho.  PT/OT
S/P Right TKR.  Continue with pain management, DVT proph, and wound care as per ortho.  PT/OT

## 2017-07-27 ENCOUNTER — APPOINTMENT (OUTPATIENT)
Dept: ORTHOPEDIC SURGERY | Facility: CLINIC | Age: 72
End: 2017-07-27
Payer: MEDICARE

## 2017-07-27 VITALS
SYSTOLIC BLOOD PRESSURE: 118 MMHG | DIASTOLIC BLOOD PRESSURE: 75 MMHG | HEART RATE: 88 BPM | WEIGHT: 165 LBS | HEIGHT: 60 IN | BODY MASS INDEX: 32.39 KG/M2

## 2017-07-27 PROCEDURE — 73562 X-RAY EXAM OF KNEE 3: CPT | Mod: RT

## 2017-07-27 PROCEDURE — 99024 POSTOP FOLLOW-UP VISIT: CPT

## 2017-07-27 RX ORDER — ACETAMINOPHEN 500 MG/1
TABLET ORAL
Refills: 0 | Status: ACTIVE | COMMUNITY

## 2017-07-27 RX ORDER — DICLOFENAC SODIUM 75 MG/1
75 TABLET, DELAYED RELEASE ORAL
Qty: 60 | Refills: 0 | Status: DISCONTINUED | COMMUNITY
Start: 2017-04-07

## 2017-07-27 NOTE — HISTORY OF PRESENT ILLNESS
[___ Weeks Post Op] : [unfilled] weeks post op [5] : the patient reports pain that is 5/10 in severity [Chills] : no chills [Constipation] : no constipation [Diarrhea] : no diarrhea [Dysuria] : no dysuria [Fever] : no fever [Nausea] : no nausea [Vomiting] : no vomiting [Xray (Date:___)] : [unfilled] Xray -  [Hardware in Good Position] : hardware in good position [No Obvious Fractures] : no obvious fractures [Good Overall Alignment] : good overall alignment [Doing Well] : is doing well [No Sign of Infection] : is showing no signs of infection [Adequate Pain Control] : has adequate pain control [de-identified] : S/p Right TKR DOS 7/10/2017\par Patient is a 71 year-old female being seen today for her first postoperative visit and prineo tape removal. She is status post right total knee replacement performed at [facility] on 7/10/2017. [de-identified] : Patient presents today for first post op visit.  States doing well, receiving in house PT, performing home exercises.  Ambulating with cane.  States feeling tightness.  Icing and elevating, no fevers reported.  Taking tylenol for pain and has tramadol on hand for severe pain. \par Patient was discharged from [facility] to home  on 7/12/2017.  Patient presents with\par her spouse for evaluation.\par She is doing well. Ambulating with a cane. Receiving home physical therapy 2-3 times a week and performing home exercises.\par Patient is using tylenol for analgesic relief on an intermittent basis. Patient reports she is tramadol one or 2 nights as needed for pain\par Patient continues to use enteric-coated aspirin for DVT prophylaxis and prevention until 6 weeks postoperative. [de-identified] : There is no evidence of infection or cellulitis. The right knee incision with prineo tape intact Patient has a stable gait with minimal postoperative limp utilizing her cane.\par The right knee can flex to approximately 100 degrees easily and can extend to approximately 0 degrees.\par There is no patellofemoral crepitus with range of motion. [de-identified] : The pernio tape was removed from the right knee incision. Patient tolerated well.\par The patient was advised of the nature of the healing process. The patient was advised of the necessity for adherence to the DVT prophylaxis protocol utilizing enteric-coated aspirin 162 mg twice daily for the next 4 weeks'.\par Patient will continue with physical therapy and home exercises. I emphasized the need to sit with the leg extended and do isometric exercises to try to promote the knee to come into full extension. I emphasized the need to elevate the right lower extremity above her heart and to apply ice 3-4 times a day for 15-20 minutes to decrease swelling . The patient was given a prescription to start outpatient physical\par Patient was reinstructed with regard to antibiotic Amoxicillin for dental prophylaxis as per ' protocol.\par Patient will continue to use Tylenol and or tramadol for analgesia.\par The patient will make her followup appointment in 6 weeks.\par The patient and her spouse had their questions answered and gave a clear understanding of the instructions. The patient was advised that she may call office if any new problems arises or if they have any questions.

## 2017-07-31 ENCOUNTER — APPOINTMENT (OUTPATIENT)
Dept: ORTHOPEDIC SURGERY | Facility: CLINIC | Age: 72
End: 2017-07-31
Payer: MEDICARE

## 2017-07-31 VITALS
BODY MASS INDEX: 32.39 KG/M2 | HEART RATE: 103 BPM | DIASTOLIC BLOOD PRESSURE: 71 MMHG | WEIGHT: 165 LBS | HEIGHT: 60 IN | SYSTOLIC BLOOD PRESSURE: 113 MMHG

## 2017-07-31 DIAGNOSIS — Z96.652 PRESENCE OF LEFT ARTIFICIAL KNEE JOINT: ICD-10-CM

## 2017-07-31 PROCEDURE — 99024 POSTOP FOLLOW-UP VISIT: CPT

## 2017-07-31 PROCEDURE — 73562 X-RAY EXAM OF KNEE 3: CPT | Mod: RT

## 2017-08-02 ENCOUNTER — CHART COPY (OUTPATIENT)
Age: 72
End: 2017-08-02

## 2017-08-28 ENCOUNTER — APPOINTMENT (OUTPATIENT)
Dept: ORTHOPEDIC SURGERY | Facility: CLINIC | Age: 72
End: 2017-08-28
Payer: MEDICARE

## 2017-08-28 VITALS
BODY MASS INDEX: 32.79 KG/M2 | SYSTOLIC BLOOD PRESSURE: 142 MMHG | HEART RATE: 82 BPM | WEIGHT: 167 LBS | DIASTOLIC BLOOD PRESSURE: 86 MMHG | HEIGHT: 60 IN

## 2017-08-28 PROCEDURE — 99024 POSTOP FOLLOW-UP VISIT: CPT

## 2017-08-28 PROCEDURE — 73562 X-RAY EXAM OF KNEE 3: CPT | Mod: 50

## 2017-10-23 ENCOUNTER — APPOINTMENT (OUTPATIENT)
Dept: ORTHOPEDIC SURGERY | Facility: CLINIC | Age: 72
End: 2017-10-23
Payer: MEDICARE

## 2017-10-23 VITALS
HEIGHT: 60 IN | WEIGHT: 167 LBS | SYSTOLIC BLOOD PRESSURE: 141 MMHG | BODY MASS INDEX: 32.79 KG/M2 | HEART RATE: 80 BPM | DIASTOLIC BLOOD PRESSURE: 82 MMHG

## 2017-10-23 PROCEDURE — 99024 POSTOP FOLLOW-UP VISIT: CPT

## 2017-10-23 PROCEDURE — 73562 X-RAY EXAM OF KNEE 3: CPT | Mod: RT

## 2017-12-26 ENCOUNTER — APPOINTMENT (OUTPATIENT)
Dept: ORTHOPEDIC SURGERY | Facility: CLINIC | Age: 72
End: 2017-12-26
Payer: MEDICARE

## 2017-12-26 VITALS
HEART RATE: 82 BPM | WEIGHT: 167 LBS | HEIGHT: 60 IN | DIASTOLIC BLOOD PRESSURE: 63 MMHG | BODY MASS INDEX: 32.79 KG/M2 | SYSTOLIC BLOOD PRESSURE: 108 MMHG

## 2017-12-26 PROCEDURE — 99213 OFFICE O/P EST LOW 20 MIN: CPT

## 2017-12-26 PROCEDURE — 73562 X-RAY EXAM OF KNEE 3: CPT | Mod: RT

## 2018-02-06 ENCOUNTER — APPOINTMENT (OUTPATIENT)
Dept: ORTHOPEDIC SURGERY | Facility: CLINIC | Age: 73
End: 2018-02-06
Payer: MEDICARE

## 2018-02-06 VITALS
DIASTOLIC BLOOD PRESSURE: 82 MMHG | SYSTOLIC BLOOD PRESSURE: 138 MMHG | HEART RATE: 86 BPM | HEIGHT: 60 IN | BODY MASS INDEX: 32.98 KG/M2 | WEIGHT: 168 LBS

## 2018-02-06 DIAGNOSIS — M25.551 PAIN IN RIGHT HIP: ICD-10-CM

## 2018-02-06 PROCEDURE — 99213 OFFICE O/P EST LOW 20 MIN: CPT

## 2018-05-31 ENCOUNTER — APPOINTMENT (OUTPATIENT)
Dept: ORTHOPEDIC SURGERY | Facility: CLINIC | Age: 73
End: 2018-05-31
Payer: MEDICARE

## 2018-05-31 VITALS — HEART RATE: 82 BPM | SYSTOLIC BLOOD PRESSURE: 141 MMHG | DIASTOLIC BLOOD PRESSURE: 83 MMHG

## 2018-05-31 DIAGNOSIS — M23.91 UNSPECIFIED INTERNAL DERANGEMENT OF RIGHT KNEE: ICD-10-CM

## 2018-05-31 PROCEDURE — 73562 X-RAY EXAM OF KNEE 3: CPT | Mod: RT

## 2018-05-31 PROCEDURE — 99213 OFFICE O/P EST LOW 20 MIN: CPT

## 2018-06-05 ENCOUNTER — FORM ENCOUNTER (OUTPATIENT)
Age: 73
End: 2018-06-05

## 2018-06-06 ENCOUNTER — OUTPATIENT (OUTPATIENT)
Dept: OUTPATIENT SERVICES | Facility: HOSPITAL | Age: 73
LOS: 1 days | End: 2018-06-06
Payer: MEDICARE

## 2018-06-06 ENCOUNTER — APPOINTMENT (OUTPATIENT)
Dept: MRI IMAGING | Facility: CLINIC | Age: 73
End: 2018-06-06
Payer: MEDICARE

## 2018-06-06 DIAGNOSIS — Z00.8 ENCOUNTER FOR OTHER GENERAL EXAMINATION: ICD-10-CM

## 2018-06-06 DIAGNOSIS — Z96.659 PRESENCE OF UNSPECIFIED ARTIFICIAL KNEE JOINT: Chronic | ICD-10-CM

## 2018-06-06 DIAGNOSIS — Z98.890 OTHER SPECIFIED POSTPROCEDURAL STATES: Chronic | ICD-10-CM

## 2018-06-06 DIAGNOSIS — E89.2 POSTPROCEDURAL HYPOPARATHYROIDISM: Chronic | ICD-10-CM

## 2018-06-06 PROCEDURE — 73721 MRI JNT OF LWR EXTRE W/O DYE: CPT

## 2018-06-06 PROCEDURE — 73721 MRI JNT OF LWR EXTRE W/O DYE: CPT | Mod: 26,RT

## 2018-06-12 ENCOUNTER — CHART COPY (OUTPATIENT)
Age: 73
End: 2018-06-12

## 2018-06-19 ENCOUNTER — APPOINTMENT (OUTPATIENT)
Dept: ORTHOPEDIC SURGERY | Facility: CLINIC | Age: 73
End: 2018-06-19
Payer: MEDICARE

## 2018-06-19 VITALS
WEIGHT: 170 LBS | HEIGHT: 60 IN | DIASTOLIC BLOOD PRESSURE: 80 MMHG | SYSTOLIC BLOOD PRESSURE: 145 MMHG | BODY MASS INDEX: 33.38 KG/M2 | HEART RATE: 71 BPM

## 2018-06-19 DIAGNOSIS — M25.561 PAIN IN RIGHT KNEE: ICD-10-CM

## 2018-06-19 DIAGNOSIS — Z96.651 PRESENCE OF RIGHT ARTIFICIAL KNEE JOINT: ICD-10-CM

## 2018-06-19 DIAGNOSIS — M25.361 OTHER INSTABILITY, RIGHT KNEE: ICD-10-CM

## 2018-06-19 PROCEDURE — 99213 OFFICE O/P EST LOW 20 MIN: CPT

## 2018-06-19 RX ORDER — CELECOXIB 200 MG/1
200 CAPSULE ORAL
Qty: 14 | Refills: 0 | Status: DISCONTINUED | COMMUNITY
Start: 2017-08-02 | End: 2018-06-19

## 2018-06-19 RX ORDER — CELECOXIB 200 MG/1
200 CAPSULE ORAL
Qty: 38 | Refills: 0 | Status: DISCONTINUED | COMMUNITY
Start: 2017-07-11 | End: 2018-06-19

## 2018-06-19 RX ORDER — TRAMADOL HYDROCHLORIDE 50 MG/1
50 TABLET, COATED ORAL
Qty: 80 | Refills: 0 | Status: DISCONTINUED | COMMUNITY
Start: 2017-07-12 | End: 2018-06-19

## 2018-09-18 PROBLEM — M19.90 UNSPECIFIED OSTEOARTHRITIS, UNSPECIFIED SITE: Chronic | Status: ACTIVE | Noted: 2017-06-20

## 2018-09-18 PROBLEM — M54.30 SCIATICA, UNSPECIFIED SIDE: Chronic | Status: ACTIVE | Noted: 2017-06-20

## 2018-09-18 PROBLEM — E78.5 HYPERLIPIDEMIA, UNSPECIFIED: Chronic | Status: ACTIVE | Noted: 2017-06-20

## 2018-09-18 PROBLEM — I10 ESSENTIAL (PRIMARY) HYPERTENSION: Chronic | Status: ACTIVE | Noted: 2017-06-20

## 2018-10-18 ENCOUNTER — OUTPATIENT (OUTPATIENT)
Dept: OUTPATIENT SERVICES | Facility: HOSPITAL | Age: 73
LOS: 1 days | End: 2018-10-18
Payer: MEDICARE

## 2018-10-18 VITALS
HEIGHT: 57 IN | OXYGEN SATURATION: 100 % | HEART RATE: 77 BPM | SYSTOLIC BLOOD PRESSURE: 130 MMHG | WEIGHT: 154.32 LBS | RESPIRATION RATE: 14 BRPM | DIASTOLIC BLOOD PRESSURE: 80 MMHG | TEMPERATURE: 98 F

## 2018-10-18 DIAGNOSIS — Z98.890 OTHER SPECIFIED POSTPROCEDURAL STATES: Chronic | ICD-10-CM

## 2018-10-18 DIAGNOSIS — Z96.659 PRESENCE OF UNSPECIFIED ARTIFICIAL KNEE JOINT: Chronic | ICD-10-CM

## 2018-10-18 DIAGNOSIS — Z96.651 PRESENCE OF RIGHT ARTIFICIAL KNEE JOINT: ICD-10-CM

## 2018-10-18 DIAGNOSIS — Z96.651 PRESENCE OF RIGHT ARTIFICIAL KNEE JOINT: Chronic | ICD-10-CM

## 2018-10-18 DIAGNOSIS — E89.2 POSTPROCEDURAL HYPOPARATHYROIDISM: Chronic | ICD-10-CM

## 2018-10-18 DIAGNOSIS — Z01.818 ENCOUNTER FOR OTHER PREPROCEDURAL EXAMINATION: ICD-10-CM

## 2018-10-18 LAB
ALBUMIN SERPL ELPH-MCNC: 4.1 G/DL — SIGNIFICANT CHANGE UP (ref 3.3–5)
ALP SERPL-CCNC: 74 U/L — SIGNIFICANT CHANGE UP (ref 30–120)
ALT FLD-CCNC: 41 U/L DA — SIGNIFICANT CHANGE UP (ref 10–60)
ANION GAP SERPL CALC-SCNC: 3 MMOL/L — LOW (ref 5–17)
APTT BLD: 31.9 SEC — SIGNIFICANT CHANGE UP (ref 27.5–37.4)
AST SERPL-CCNC: 37 U/L — SIGNIFICANT CHANGE UP (ref 10–40)
BILIRUB SERPL-MCNC: 0.7 MG/DL — SIGNIFICANT CHANGE UP (ref 0.2–1.2)
BLD GP AB SCN SERPL QL: SIGNIFICANT CHANGE UP
BUN SERPL-MCNC: 19 MG/DL — SIGNIFICANT CHANGE UP (ref 7–23)
CALCIUM SERPL-MCNC: 9.9 MG/DL — SIGNIFICANT CHANGE UP (ref 8.4–10.5)
CHLORIDE SERPL-SCNC: 102 MMOL/L — SIGNIFICANT CHANGE UP (ref 96–108)
CO2 SERPL-SCNC: 32 MMOL/L — HIGH (ref 22–31)
CREAT SERPL-MCNC: 0.94 MG/DL — SIGNIFICANT CHANGE UP (ref 0.5–1.3)
GLUCOSE SERPL-MCNC: 104 MG/DL — HIGH (ref 70–99)
HCT VFR BLD CALC: 41.1 % — SIGNIFICANT CHANGE UP (ref 34.5–45)
HGB BLD-MCNC: 13.8 G/DL — SIGNIFICANT CHANGE UP (ref 11.5–15.5)
INR BLD: 1.04 RATIO — SIGNIFICANT CHANGE UP (ref 0.88–1.16)
MCHC RBC-ENTMCNC: 30.5 PG — SIGNIFICANT CHANGE UP (ref 27–34)
MCHC RBC-ENTMCNC: 33.6 GM/DL — SIGNIFICANT CHANGE UP (ref 32–36)
MCV RBC AUTO: 90.9 FL — SIGNIFICANT CHANGE UP (ref 80–100)
MRSA PCR RESULT.: SIGNIFICANT CHANGE UP
NRBC # BLD: 0 /100 WBCS — SIGNIFICANT CHANGE UP (ref 0–0)
PLATELET # BLD AUTO: 249 K/UL — SIGNIFICANT CHANGE UP (ref 150–400)
POTASSIUM SERPL-MCNC: 5.1 MMOL/L — SIGNIFICANT CHANGE UP (ref 3.5–5.3)
POTASSIUM SERPL-SCNC: 5.1 MMOL/L — SIGNIFICANT CHANGE UP (ref 3.5–5.3)
PROT SERPL-MCNC: 7.3 G/DL — SIGNIFICANT CHANGE UP (ref 6–8.3)
PROTHROM AB SERPL-ACNC: 11.3 SEC — SIGNIFICANT CHANGE UP (ref 9.8–12.7)
RBC # BLD: 4.52 M/UL — SIGNIFICANT CHANGE UP (ref 3.8–5.2)
RBC # FLD: 13.3 % — SIGNIFICANT CHANGE UP (ref 10.3–14.5)
S AUREUS DNA NOSE QL NAA+PROBE: SIGNIFICANT CHANGE UP
SODIUM SERPL-SCNC: 137 MMOL/L — SIGNIFICANT CHANGE UP (ref 135–145)
WBC # BLD: 6.14 K/UL — SIGNIFICANT CHANGE UP (ref 3.8–10.5)
WBC # FLD AUTO: 6.14 K/UL — SIGNIFICANT CHANGE UP (ref 3.8–10.5)

## 2018-10-18 PROCEDURE — 80053 COMPREHEN METABOLIC PANEL: CPT

## 2018-10-18 PROCEDURE — G0463: CPT

## 2018-10-18 PROCEDURE — 93010 ELECTROCARDIOGRAM REPORT: CPT

## 2018-10-18 PROCEDURE — 86850 RBC ANTIBODY SCREEN: CPT

## 2018-10-18 PROCEDURE — 87640 STAPH A DNA AMP PROBE: CPT

## 2018-10-18 PROCEDURE — 85027 COMPLETE CBC AUTOMATED: CPT

## 2018-10-18 PROCEDURE — 85610 PROTHROMBIN TIME: CPT

## 2018-10-18 PROCEDURE — 86901 BLOOD TYPING SEROLOGIC RH(D): CPT

## 2018-10-18 PROCEDURE — 85730 THROMBOPLASTIN TIME PARTIAL: CPT

## 2018-10-18 PROCEDURE — 86900 BLOOD TYPING SEROLOGIC ABO: CPT

## 2018-10-18 PROCEDURE — 93005 ELECTROCARDIOGRAM TRACING: CPT

## 2018-10-18 NOTE — H&P PST ADULT - HISTORY OF PRESENT ILLNESS
This is a 73 y/o female who had undergone right knee replacement on January 2017 presents with complaint of right knee swelling,stiffness buckling, instability and difficulty walking .patient states that she fell after knee replacement which exacerbated the symptoms . MRI revealed loose implant   . scheduled for revision of right knee replacement on 11/7/18

## 2018-10-18 NOTE — H&P PST ADULT - PSH
History of bunionectomy of right great toe  2007  S/P carpal tunnel release  left hand 2006,right hand 2007  S/P knee replacement  left knee 2012  S/P parathyroidectomy  2005  Status post right knee replacement  2017

## 2018-11-06 ENCOUNTER — TRANSCRIPTION ENCOUNTER (OUTPATIENT)
Age: 73
End: 2018-11-06

## 2018-11-06 RX ORDER — MAGNESIUM HYDROXIDE 400 MG/1
30 TABLET, CHEWABLE ORAL DAILY
Qty: 0 | Refills: 0 | Status: DISCONTINUED | OUTPATIENT
Start: 2018-11-07 | End: 2018-11-08

## 2018-11-06 RX ORDER — DOCUSATE SODIUM 100 MG
100 CAPSULE ORAL THREE TIMES A DAY
Qty: 0 | Refills: 0 | Status: DISCONTINUED | OUTPATIENT
Start: 2018-11-07 | End: 2018-11-08

## 2018-11-06 RX ORDER — POLYETHYLENE GLYCOL 3350 17 G/17G
17 POWDER, FOR SOLUTION ORAL DAILY
Qty: 0 | Refills: 0 | Status: DISCONTINUED | OUTPATIENT
Start: 2018-11-07 | End: 2018-11-08

## 2018-11-06 RX ORDER — ONDANSETRON 8 MG/1
4 TABLET, FILM COATED ORAL EVERY 6 HOURS
Qty: 0 | Refills: 0 | Status: DISCONTINUED | OUTPATIENT
Start: 2018-11-07 | End: 2018-11-08

## 2018-11-06 RX ORDER — PANTOPRAZOLE SODIUM 20 MG/1
40 TABLET, DELAYED RELEASE ORAL DAILY
Qty: 0 | Refills: 0 | Status: DISCONTINUED | OUTPATIENT
Start: 2018-11-07 | End: 2018-11-08

## 2018-11-06 RX ORDER — SODIUM CHLORIDE 9 MG/ML
1000 INJECTION, SOLUTION INTRAVENOUS
Qty: 0 | Refills: 0 | Status: DISCONTINUED | OUTPATIENT
Start: 2018-11-07 | End: 2018-11-08

## 2018-11-06 RX ORDER — SENNA PLUS 8.6 MG/1
2 TABLET ORAL AT BEDTIME
Qty: 0 | Refills: 0 | Status: DISCONTINUED | OUTPATIENT
Start: 2018-11-07 | End: 2018-11-08

## 2018-11-07 ENCOUNTER — INPATIENT (INPATIENT)
Facility: HOSPITAL | Age: 73
LOS: 0 days | Discharge: HOME | DRG: 468 | End: 2018-11-08
Attending: ORTHOPAEDIC SURGERY | Admitting: ORTHOPAEDIC SURGERY
Payer: MEDICARE

## 2018-11-07 ENCOUNTER — APPOINTMENT (OUTPATIENT)
Dept: ORTHOPEDIC SURGERY | Facility: HOSPITAL | Age: 73
End: 2018-11-07

## 2018-11-07 ENCOUNTER — RESULT REVIEW (OUTPATIENT)
Age: 73
End: 2018-11-07

## 2018-11-07 VITALS
DIASTOLIC BLOOD PRESSURE: 68 MMHG | TEMPERATURE: 97 F | HEART RATE: 91 BPM | WEIGHT: 155.21 LBS | SYSTOLIC BLOOD PRESSURE: 138 MMHG | OXYGEN SATURATION: 100 % | RESPIRATION RATE: 20 BRPM | HEIGHT: 60 IN

## 2018-11-07 DIAGNOSIS — Z96.651 PRESENCE OF RIGHT ARTIFICIAL KNEE JOINT: ICD-10-CM

## 2018-11-07 DIAGNOSIS — Z98.890 OTHER SPECIFIED POSTPROCEDURAL STATES: Chronic | ICD-10-CM

## 2018-11-07 DIAGNOSIS — Z01.818 ENCOUNTER FOR OTHER PREPROCEDURAL EXAMINATION: ICD-10-CM

## 2018-11-07 DIAGNOSIS — Z96.651 PRESENCE OF RIGHT ARTIFICIAL KNEE JOINT: Chronic | ICD-10-CM

## 2018-11-07 DIAGNOSIS — Z96.659 PRESENCE OF UNSPECIFIED ARTIFICIAL KNEE JOINT: Chronic | ICD-10-CM

## 2018-11-07 DIAGNOSIS — E89.2 POSTPROCEDURAL HYPOPARATHYROIDISM: Chronic | ICD-10-CM

## 2018-11-07 PROCEDURE — 27486 REVISE/REPLACE KNEE JOINT: CPT | Mod: 82,RT

## 2018-11-07 PROCEDURE — 99223 1ST HOSP IP/OBS HIGH 75: CPT

## 2018-11-07 PROCEDURE — 88305 TISSUE EXAM BY PATHOLOGIST: CPT | Mod: 26

## 2018-11-07 PROCEDURE — 73562 X-RAY EXAM OF KNEE 3: CPT | Mod: 26,RT

## 2018-11-07 PROCEDURE — 88311 DECALCIFY TISSUE: CPT | Mod: 26

## 2018-11-07 PROCEDURE — 27486 REVISE/REPLACE KNEE JOINT: CPT | Mod: RT

## 2018-11-07 PROCEDURE — 88300 SURGICAL PATH GROSS: CPT | Mod: 26

## 2018-11-07 RX ORDER — SODIUM CHLORIDE 9 MG/ML
1000 INJECTION, SOLUTION INTRAVENOUS
Qty: 0 | Refills: 0 | Status: DISCONTINUED | OUTPATIENT
Start: 2018-11-07 | End: 2018-11-07

## 2018-11-07 RX ORDER — APIXABAN 2.5 MG/1
2.5 TABLET, FILM COATED ORAL EVERY 12 HOURS
Qty: 0 | Refills: 0 | Status: DISCONTINUED | OUTPATIENT
Start: 2018-11-08 | End: 2018-11-08

## 2018-11-07 RX ORDER — TRANEXAMIC ACID 100 MG/ML
1000 INJECTION, SOLUTION INTRAVENOUS ONCE
Qty: 0 | Refills: 0 | Status: COMPLETED | OUTPATIENT
Start: 2018-11-07 | End: 2018-11-07

## 2018-11-07 RX ORDER — ACETAMINOPHEN 500 MG
1000 TABLET ORAL EVERY 8 HOURS
Qty: 0 | Refills: 0 | Status: DISCONTINUED | OUTPATIENT
Start: 2018-11-08 | End: 2018-11-08

## 2018-11-07 RX ORDER — ACETAMINOPHEN 500 MG
1000 TABLET ORAL EVERY 6 HOURS
Qty: 0 | Refills: 0 | Status: COMPLETED | OUTPATIENT
Start: 2018-11-07 | End: 2018-11-08

## 2018-11-07 RX ORDER — HYDROMORPHONE HYDROCHLORIDE 2 MG/ML
0.5 INJECTION INTRAMUSCULAR; INTRAVENOUS; SUBCUTANEOUS
Qty: 0 | Refills: 0 | Status: DISCONTINUED | OUTPATIENT
Start: 2018-11-07 | End: 2018-11-07

## 2018-11-07 RX ORDER — ONDANSETRON 8 MG/1
4 TABLET, FILM COATED ORAL ONCE
Qty: 0 | Refills: 0 | Status: DISCONTINUED | OUTPATIENT
Start: 2018-11-07 | End: 2018-11-07

## 2018-11-07 RX ORDER — LISINOPRIL 2.5 MG/1
1 TABLET ORAL
Qty: 0 | Refills: 0 | COMMUNITY

## 2018-11-07 RX ORDER — FENTANYL CITRATE 50 UG/ML
25 INJECTION INTRAVENOUS
Qty: 0 | Refills: 0 | Status: DISCONTINUED | OUTPATIENT
Start: 2018-11-07 | End: 2018-11-07

## 2018-11-07 RX ORDER — LISINOPRIL 2.5 MG/1
40 TABLET ORAL DAILY
Qty: 0 | Refills: 0 | Status: DISCONTINUED | OUTPATIENT
Start: 2018-11-09 | End: 2018-11-08

## 2018-11-07 RX ORDER — CEFAZOLIN SODIUM 1 G
2000 VIAL (EA) INJECTION ONCE
Qty: 0 | Refills: 0 | Status: COMPLETED | OUTPATIENT
Start: 2018-11-07 | End: 2018-11-07

## 2018-11-07 RX ORDER — ATORVASTATIN CALCIUM 80 MG/1
40 TABLET, FILM COATED ORAL AT BEDTIME
Qty: 0 | Refills: 0 | Status: DISCONTINUED | OUTPATIENT
Start: 2018-11-07 | End: 2018-11-08

## 2018-11-07 RX ORDER — SIMVASTATIN 20 MG/1
1 TABLET, FILM COATED ORAL
Qty: 0 | Refills: 0 | COMMUNITY

## 2018-11-07 RX ORDER — HYDROMORPHONE HYDROCHLORIDE 2 MG/ML
0.5 INJECTION INTRAMUSCULAR; INTRAVENOUS; SUBCUTANEOUS
Qty: 0 | Refills: 0 | Status: DISCONTINUED | OUTPATIENT
Start: 2018-11-07 | End: 2018-11-08

## 2018-11-07 RX ORDER — CHLORHEXIDINE GLUCONATE 213 G/1000ML
1 SOLUTION TOPICAL ONCE
Qty: 0 | Refills: 0 | Status: COMPLETED | OUTPATIENT
Start: 2018-11-07 | End: 2018-11-07

## 2018-11-07 RX ORDER — OXYCODONE AND ACETAMINOPHEN 5; 325 MG/1; MG/1
1 TABLET ORAL ONCE
Qty: 0 | Refills: 0 | Status: DISCONTINUED | OUTPATIENT
Start: 2018-11-07 | End: 2018-11-07

## 2018-11-07 RX ORDER — OXYCODONE HYDROCHLORIDE 5 MG/1
10 TABLET ORAL
Qty: 0 | Refills: 0 | Status: DISCONTINUED | OUTPATIENT
Start: 2018-11-07 | End: 2018-11-08

## 2018-11-07 RX ORDER — CEFAZOLIN SODIUM 1 G
2000 VIAL (EA) INJECTION EVERY 8 HOURS
Qty: 0 | Refills: 0 | Status: COMPLETED | OUTPATIENT
Start: 2018-11-07 | End: 2018-11-08

## 2018-11-07 RX ORDER — OXYCODONE HYDROCHLORIDE 5 MG/1
5 TABLET ORAL
Qty: 0 | Refills: 0 | Status: DISCONTINUED | OUTPATIENT
Start: 2018-11-07 | End: 2018-11-08

## 2018-11-07 RX ORDER — CELECOXIB 200 MG/1
200 CAPSULE ORAL
Qty: 0 | Refills: 0 | Status: DISCONTINUED | OUTPATIENT
Start: 2018-11-07 | End: 2018-11-08

## 2018-11-07 RX ADMIN — Medication 100 MILLIGRAM(S): at 21:21

## 2018-11-07 RX ADMIN — ATORVASTATIN CALCIUM 40 MILLIGRAM(S): 80 TABLET, FILM COATED ORAL at 21:20

## 2018-11-07 RX ADMIN — Medication 100 MILLIGRAM(S): at 16:25

## 2018-11-07 RX ADMIN — SENNA PLUS 2 TABLET(S): 8.6 TABLET ORAL at 21:20

## 2018-11-07 RX ADMIN — CELECOXIB 200 MILLIGRAM(S): 200 CAPSULE ORAL at 22:20

## 2018-11-07 RX ADMIN — Medication 1000 MILLIGRAM(S): at 15:15

## 2018-11-07 RX ADMIN — CHLORHEXIDINE GLUCONATE 1 APPLICATION(S): 213 SOLUTION TOPICAL at 06:07

## 2018-11-07 RX ADMIN — Medication 400 MILLIGRAM(S): at 21:20

## 2018-11-07 RX ADMIN — SODIUM CHLORIDE 125 MILLILITER(S): 9 INJECTION, SOLUTION INTRAVENOUS at 14:38

## 2018-11-07 RX ADMIN — CELECOXIB 200 MILLIGRAM(S): 200 CAPSULE ORAL at 21:20

## 2018-11-07 RX ADMIN — Medication 1000 MILLIGRAM(S): at 22:20

## 2018-11-07 RX ADMIN — Medication 400 MILLIGRAM(S): at 14:38

## 2018-11-07 RX ADMIN — Medication 100 MILLIGRAM(S): at 14:38

## 2018-11-07 RX ADMIN — SODIUM CHLORIDE 75 MILLILITER(S): 9 INJECTION, SOLUTION INTRAVENOUS at 06:07

## 2018-11-07 NOTE — PHYSICAL THERAPY INITIAL EVALUATION ADULT - GAIT DEVIATIONS NOTED, PT EVAL
decreased step length/decreased stride length/decreased weight-shifting ability/increased time in double stance/decreased julian

## 2018-11-07 NOTE — OCCUPATIONAL THERAPY INITIAL EVALUATION ADULT - LOWER BODY DRESSING, PREVIOUS LEVEL OF FUNCTION, OT EVAL
patient was playing and hit her head on side of bed, presents with one inch non bleeding laceration above right eyebrow. patient calm and cooperative not crying at triage. independent

## 2018-11-07 NOTE — PROGRESS NOTE ADULT - SUBJECTIVE AND OBJECTIVE BOX
PCP:  Dr. Nicholas    Information Obtained from:  EHR, Physical Chart, Patient at bedside (relevant EHR and Chart information verified with patient)    CC : Patient is a 72y old  Female who presents with a chief complaint of right knee swelling    HPI:  71yo F admitted s/p revision of Right TKR today.  Had right TKR in July 2017, recently developed difficulty with ambulation due to right knee swelling, stiffness, throbbing pain, "buckling".  Had MRI which showed loosening of the implant.  Symptoms started shortly after she suffered a fall after the original knee replacement.       REVIEW OF SYSTEMS:  CONSTITUTIONAL: No fever, weight loss, or fatigue  EYES: No eye pain, visual disturbances, or discharge  ENMT:  No difficulty hearing, tinnitus, vertigo; No sinus or throat pain  NECK: No pain or stiffness  BREASTS: No pain, masses, or nipple discharge  RESPIRATORY: No cough, wheezing, chills or hemoptysis; No shortness of breath  CARDIOVASCULAR: No chest pain, palpitations, dizziness, or leg swelling  GASTROINTESTINAL: No abdominal or epigastric pain. No nausea, vomiting, or hematemesis; No diarrhea or constipation. No melena or hematochezia.  GENITOURINARY: No dysuria, frequency, hematuria, or incontinence  NEUROLOGICAL: No headaches, memory loss, loss of strength, numbness, or tremors  SKIN: No itching, burning, rashes, or lesions   LYMPH NODES: No enlarged glands  ENDOCRINE: No heat or cold intolerance; No hair loss  MUSCULOSKELETAL: No muscle or back pain  PSYCHIATRIC: No depression, anxiety, mood swings, or difficulty sleeping  HEME/LYMPH: No easy bruising, or bleeding gums  ALLERGY AND IMMUNOLOGIC: No hives or eczema    PAST MEDICAL & SURGICAL HISTORY:  Sciatica  HLD (hyperlipidemia)  HTN (hypertension)  Osteoarthritis  Status post right knee replacement: 2017  History of bunionectomy of right great toe: 2007  S/P carpal tunnel release: left hand 2006,right hand 2007  S/P knee replacement: left knee 2012  S/P parathyroidectomy: 2005    SOCIAL HISTORY:  Lives: with spouse  Smoking Hx: None  ETOH Hx: 1-2 drinks per month on average  Illicit Drug Use:  None    Allergies    No Known Allergies    Intolerances    Home Medications:  aspirin 81 mg oral delayed release tablet: 1 tab(s) orally 3 times a week (07 Nov 2018 06:15)  Calcium 600+D oral tablet:  orally once a day (07 Nov 2018 06:15)  famotidine 20 mg oral tablet:  (07 Nov 2018 06:15)  hydroCHLOROthiazide 12.5 mg oral capsule: 1 cap(s) orally once a day (07 Nov 2018 06:15)  lisinopril 40 mg oral tablet: 1 tab(s) orally once a day (07 Nov 2018 06:15)  simvastatin 80 mg oral tablet: 1 tab(s) orally once a day (at bedtime) (07 Nov 2018 06:15)    FAMILY HISTORY:  Family history of cancer in father (Father): esophageal cancer  Family history of multiple myeloma (Sibling): brother  Family history of pulmonary embolism (Mother): CHF    PHYSICAL EXAM:  Vital Signs Last 24 Hrs  T(C): 36.3 (07 Nov 2018 05:57), Max: 36.3 (07 Nov 2018 05:57)  T(F): 97.4 (07 Nov 2018 05:57), Max: 97.4 (07 Nov 2018 05:57)  HR: 91 (07 Nov 2018 05:57) (91 - 91)  BP: 138/68 (07 Nov 2018 05:57) (138/68 - 138/68)  BP(mean): --  RR: 20 (07 Nov 2018 05:57) (20 - 20)  SpO2: 100% (07 Nov 2018 05:57) (100% - 100%)    GENERAL: NAD, well-groomed, well-developed, awake, alert, oriented x 3, fluent and coherent speech  EYES: EOMI, PERRLA, conjunctiva and sclera clear  ENMT: No tonsillar erythema, exudates, or enlargement; Moist mucous membranes, Good dentition, No lesions  NECK: Supple, No JVD, No Cervical LAD, No thyromegaly, No thyroid nodules  NERVOUS SYSTEM:  Good concentration;No facial droop  CHEST WALL: No masses  CHEST/LUNG: Clear to auscultation bilaterally; No rales, rhonchi, wheezing, or rubs  HEART: Regular rate and rhythm; No murmurs, rubs, or gallops  ABDOMEN: Soft, Nontender, Nondistended, Bowel sounds present, No palpable masses or organomegaly, No bruits  EXTREMITIES:  2+ Peripheral Pulses, No clubbing, cyanosis, or edema  INCISION:  Dressing clean/dry/intact     EKG (was personally reviewed): NSR 71 CONSULT NOTE    PCP:  Dr. Nicholas    Information Obtained from:  EHR, Physical Chart, Patient at bedside (relevant EHR and Chart information verified with patient)    CC : Patient is a 72y old  Female who presents with a chief complaint of right knee swelling    HPI:  73yo F admitted s/p revision of Right TKR today.  Had right TKR in July 2017, recently developed difficulty with ambulation due to right knee swelling, stiffness, throbbing pain, "buckling".  Had MRI which showed loosening of the implant.  Symptoms started shortly after she suffered a fall after the original knee replacement.       REVIEW OF SYSTEMS:  CONSTITUTIONAL: No fever, weight loss, or fatigue  EYES: No eye pain, visual disturbances, or discharge  ENMT:  No difficulty hearing, tinnitus, vertigo; No sinus or throat pain  NECK: No pain or stiffness  BREASTS: No pain, masses, or nipple discharge  RESPIRATORY: No cough, wheezing, chills or hemoptysis; No shortness of breath  CARDIOVASCULAR: No chest pain, palpitations, dizziness, or leg swelling  GASTROINTESTINAL: No abdominal or epigastric pain. No nausea, vomiting, or hematemesis; No diarrhea or constipation. No melena or hematochezia.  GENITOURINARY: No dysuria, frequency, hematuria, or incontinence  NEUROLOGICAL: No headaches, memory loss, loss of strength, numbness, or tremors  SKIN: No itching, burning, rashes, or lesions   LYMPH NODES: No enlarged glands  ENDOCRINE: No heat or cold intolerance; No hair loss  MUSCULOSKELETAL: No muscle or back pain  PSYCHIATRIC: No depression, anxiety, mood swings, or difficulty sleeping  HEME/LYMPH: No easy bruising, or bleeding gums  ALLERGY AND IMMUNOLOGIC: No hives or eczema    PAST MEDICAL & SURGICAL HISTORY:  Sciatica  HLD (hyperlipidemia)  HTN (hypertension)  Osteoarthritis  Status post right knee replacement: 2017  History of bunionectomy of right great toe: 2007  S/P carpal tunnel release: left hand 2006,right hand 2007  S/P knee replacement: left knee 2012  S/P parathyroidectomy: 2005    SOCIAL HISTORY:  Lives: with spouse  Smoking Hx: None  ETOH Hx: 1-2 drinks per month on average  Illicit Drug Use:  None    Allergies    No Known Allergies    Intolerances    Home Medications:  aspirin 81 mg oral delayed release tablet: 1 tab(s) orally 3 times a week (07 Nov 2018 06:15)  Calcium 600+D oral tablet:  orally once a day (07 Nov 2018 06:15)  famotidine 20 mg oral tablet:  (07 Nov 2018 06:15)  hydroCHLOROthiazide 12.5 mg oral capsule: 1 cap(s) orally once a day (07 Nov 2018 06:15)  lisinopril 40 mg oral tablet: 1 tab(s) orally once a day (07 Nov 2018 06:15)  simvastatin 80 mg oral tablet: 1 tab(s) orally once a day (at bedtime) (07 Nov 2018 06:15)    FAMILY HISTORY:  Family history of cancer in father (Father): esophageal cancer  Family history of multiple myeloma (Sibling): brother  Family history of pulmonary embolism (Mother): CHF    PHYSICAL EXAM:  Vital Signs Last 24 Hrs  T(C): 36.3 (07 Nov 2018 05:57), Max: 36.3 (07 Nov 2018 05:57)  T(F): 97.4 (07 Nov 2018 05:57), Max: 97.4 (07 Nov 2018 05:57)  HR: 91 (07 Nov 2018 05:57) (91 - 91)  BP: 138/68 (07 Nov 2018 05:57) (138/68 - 138/68)  BP(mean): --  RR: 20 (07 Nov 2018 05:57) (20 - 20)  SpO2: 100% (07 Nov 2018 05:57) (100% - 100%)    GENERAL: NAD, well-groomed, well-developed, awake, alert, oriented x 3, fluent and coherent speech  EYES: EOMI, PERRLA, conjunctiva and sclera clear  ENMT: No tonsillar erythema, exudates, or enlargement; Moist mucous membranes, Good dentition, No lesions  NECK: Supple, No JVD, No Cervical LAD, No thyromegaly, No thyroid nodules  NERVOUS SYSTEM:  Good concentration;No facial droop  CHEST WALL: No masses  CHEST/LUNG: Clear to auscultation bilaterally; No rales, rhonchi, wheezing, or rubs  HEART: Regular rate and rhythm; No murmurs, rubs, or gallops  ABDOMEN: Soft, Nontender, Nondistended, Bowel sounds present, No palpable masses or organomegaly, No bruits  EXTREMITIES:  2+ Peripheral Pulses, No clubbing, cyanosis, or edema  INCISION:  Dressing clean/dry/intact     EKG (was personally reviewed): NSR 71 CONSULT NOTE    PCP:  Dr. Nicohlas    Information Obtained from:  EHR, Physical Chart, Patient at bedside (relevant EHR and Chart information verified with patient)    CC : Patient is a 72y old  Female who presents with a chief complaint of right knee swelling    HPI:  71yo F admitted s/p revision of Right TKR today.  Had right TKR in July 2017, recently developed difficulty with ambulation due to right knee swelling, stiffness, throbbing pain, "buckling".  Had MRI which showed loosening of the implant.  Suffered a fall 2 weeks after initial surgery at home, symptoms started a few months after.    REVIEW OF SYSTEMS:  CONSTITUTIONAL: No fever, weight loss, or fatigue  EYES: No eye pain, visual disturbances, or discharge  ENMT:  No difficulty hearing, tinnitus, vertigo; No sinus or throat pain  NECK: No pain or stiffness  BREASTS: No pain, masses, or nipple discharge  RESPIRATORY: No cough, wheezing, chills or hemoptysis; No shortness of breath  CARDIOVASCULAR: No chest pain, palpitations, dizziness, or leg swelling  GASTROINTESTINAL: No abdominal or epigastric pain. No nausea, vomiting, or hematemesis; No diarrhea or constipation. No melena or hematochezia.  GENITOURINARY: No dysuria, frequency, hematuria, or incontinence  NEUROLOGICAL: No headaches, memory loss, loss of strength, numbness, or tremors  SKIN: No itching, burning, rashes, or lesions   LYMPH NODES: No enlarged glands  ENDOCRINE: No heat or cold intolerance; No hair loss  MUSCULOSKELETAL: No muscle or back pain  PSYCHIATRIC: No depression, anxiety, mood swings, or difficulty sleeping  HEME/LYMPH: No easy bruising, or bleeding gums  ALLERGY AND IMMUNOLOGIC: No hives or eczema    PAST MEDICAL & SURGICAL HISTORY:  Sciatica  HLD (hyperlipidemia)  HTN (hypertension)  Osteoarthritis  Status post right knee replacement: 2017  History of bunionectomy of right great toe: 2007  S/P carpal tunnel release: left hand 2006,right hand 2007  S/P knee replacement: left knee 2012  S/P parathyroidectomy: 2005    SOCIAL HISTORY:  Lives: with spouse  Smoking Hx: None  ETOH Hx: 1-2 drinks per month on average  Illicit Drug Use:  None    Allergies    No Known Allergies    Intolerances    Home Medications:  aspirin 81 mg oral delayed release tablet: 1 tab(s) orally 3 times a week (07 Nov 2018 06:15)  Calcium 600+D oral tablet:  orally once a day (07 Nov 2018 06:15)  famotidine 20 mg oral tablet:  (07 Nov 2018 06:15)  hydroCHLOROthiazide 12.5 mg oral capsule: 1 cap(s) orally once a day (07 Nov 2018 06:15)  lisinopril 40 mg oral tablet: 1 tab(s) orally once a day (07 Nov 2018 06:15)  simvastatin 80 mg oral tablet: 1 tab(s) orally once a day (at bedtime) (07 Nov 2018 06:15)    FAMILY HISTORY:  Family history of cancer in father (Father): esophageal cancer  Family history of multiple myeloma (Sibling): brother  Family history of pulmonary embolism (Mother): CHF    PHYSICAL EXAM:  Vital Signs Last 24 Hrs  T(C): 36.3 (07 Nov 2018 05:57), Max: 36.3 (07 Nov 2018 05:57)  T(F): 97.4 (07 Nov 2018 05:57), Max: 97.4 (07 Nov 2018 05:57)  HR: 91 (07 Nov 2018 05:57) (91 - 91)  BP: 138/68 (07 Nov 2018 05:57) (138/68 - 138/68)  BP(mean): --  RR: 20 (07 Nov 2018 05:57) (20 - 20)  SpO2: 100% (07 Nov 2018 05:57) (100% - 100%)    GENERAL: NAD, well-groomed, well-developed, awake, alert, oriented x 3, fluent and coherent speech  EYES: EOMI, PERRLA, conjunctiva and sclera clear  ENMT: No tonsillar erythema, exudates, or enlargement; Moist mucous membranes, Good dentition, No lesions  NECK: Supple, No JVD, No Cervical LAD, No thyromegaly, No thyroid nodules  NERVOUS SYSTEM:  Good concentration;No facial droop  CHEST/LUNG: Clear to auscultation bilaterally; No rales, rhonchi, wheezing, or rubs  HEART: Regular rate and rhythm; No murmurs, rubs, or gallops  ABDOMEN: Soft, Nontender, Nondistended, Bowel sounds present, No palpable masses or organomegaly, No bruits  EXTREMITIES:  2+ Peripheral Pulses, No clubbing, cyanosis, or edema  INCISION:  Dressing clean/dry/intact     EKG (was personally reviewed): NSR 71

## 2018-11-07 NOTE — PROGRESS NOTE ADULT - SUBJECTIVE AND OBJECTIVE BOX
ORTHOPEDIC PA PROGRESS NOTE  GLORIA BOWLES      72y Female                                                                                                                               POD #    0    STATUS POST:               Pre-Op Dx: Failure of arthroplasty, initial encounter    Post-Op Dx:  Failure of arthroplasty, initial encounter    Procedure: Total knee replacement: revision right knee replacement                                                Pain (0-10):  Pt reports no pain to right knee at this time. Denies any CP, SOB, fever, chills, numbness/tingling. pt feels like she needs to urinate now and is going to call the nurse.   Current Pain Management:  [x ] Po Analgesics [ x] IM /IV Analgesics     T(F): 97.2  HR: 77  BP: 102/70  RR: 16  SpO2: 96%               Physical Exam :    -   Dressing C/D/I.   -   Distal Neurvascular status intact grossly.   -   Warm well perfused; capillary refill <3 seconds   -   (+)EHL/FHL   -   (+) Sensation to light touch  -   (-) Calf tenderness Bilaterally    A/P: 72y Female s/p Total knee replacement: revision right knee replacement     -   Continue PT/OT  -   Pain control   -   Medicine to follow  -   DVT ppx:     [ x]SCDs        [x ] Eliquis      -   Weight bearing status:  WBAT    -  Dispo:     Home when medically and PT cleared

## 2018-11-07 NOTE — OCCUPATIONAL THERAPY INITIAL EVALUATION ADULT - NS ASR FOLLOW COMMAND OT EVAL
100% of the time/Patient educated verbally regarding role of OT, LE weight bearing status & pt. provided with education folder including functional exercises, TKR education & caregiver guide pamphlet. Pt educated re d/c plan & DME needs (SW/case mgmt notified via sunrise)

## 2018-11-07 NOTE — OCCUPATIONAL THERAPY INITIAL EVALUATION ADULT - PERTINENT HX OF CURRENT PROBLEM, REHAB EVAL
73yo F admitted s/p revision of Right TKR today.  Had right TKR in July 2017, recently developed difficulty with ambulation due to right knee swelling, stiffness, throbbing pain, "buckling".  Had MRI which showed loosening of the implant.  Suffered a fall 2 weeks after initial surgery at home, symptoms started a few months after. s/p revision of Right TKR.  Had right TKR in July 2017, recently developed difficulty with ambulation due to right knee swelling, stiffness, throbbing pain, "buckling".  Had MRI which showed loosening of the implant.  Suffered a fall 2 weeks after initial surgery at home, symptoms started a few months after. s/p revision of Right TKR.  Had right TKR in July 2017, recently developed difficulty with ambulation due to right knee swelling, stiffness, throbbing pain, "buckling".  Had MRI which showed loosening of the implant.  Suffered a fall  2 weeks after initial surgery at home, symptoms started a few months after.

## 2018-11-07 NOTE — PROGRESS NOTE ADULT - ASSESSMENT
POD#0 s/p revision of RIGHT TKR (originally done July 2017)  - Pain control  - Bowel regimen  - PT/OT  - DVT PPx - Caprini 11 -> Eliquis    HTN  - resume ACEI on POD#2  - hold HCTZ while on IVF    HLD  - resume statin    Chronic Aspirin Use  - POD#0 s/p revision of RIGHT TKR (originally done July 2017)  - Pain control  - Bowel regimen  - PT/OT  - DVT PPx - Caprini 11 -> Eliquis    HTN  - resume ACEI on POD#2  - hold HCTZ while on IVF    HLD  - resume statin    Chronic Aspirin Use  - takes TIW for primary prevention  - can hold while on Eliquis    Family Hx - Mother with PE  - no other family members with known VTE  - patient cannot recall circumstances around mother's PE  - no recent or current calf pain, cp, sob

## 2018-11-07 NOTE — BRIEF OPERATIVE NOTE - PROCEDURE
<<-----Click on this checkbox to enter Procedure Total knee replacement  11/07/2018  revision right knee replacement  Active  [id number]

## 2018-11-07 NOTE — PHYSICAL THERAPY INITIAL EVALUATION ADULT - RANGE OF MOTION EXAMINATION, REHAB EVAL
right LE not tested due to surgery/Left LE ROM was WNL (within normal limits)/bilateral upper extremity ROM was WNL (within normal limits)

## 2018-11-08 ENCOUNTER — TRANSCRIPTION ENCOUNTER (OUTPATIENT)
Age: 73
End: 2018-11-08

## 2018-11-08 VITALS
HEART RATE: 74 BPM | DIASTOLIC BLOOD PRESSURE: 65 MMHG | RESPIRATION RATE: 16 BRPM | TEMPERATURE: 98 F | OXYGEN SATURATION: 100 % | SYSTOLIC BLOOD PRESSURE: 115 MMHG

## 2018-11-08 LAB
ANION GAP SERPL CALC-SCNC: 10 MMOL/L — SIGNIFICANT CHANGE UP (ref 5–17)
BUN SERPL-MCNC: 21 MG/DL — SIGNIFICANT CHANGE UP (ref 7–23)
CALCIUM SERPL-MCNC: 9.1 MG/DL — SIGNIFICANT CHANGE UP (ref 8.4–10.5)
CHLORIDE SERPL-SCNC: 104 MMOL/L — SIGNIFICANT CHANGE UP (ref 96–108)
CO2 SERPL-SCNC: 22 MMOL/L — SIGNIFICANT CHANGE UP (ref 22–31)
CREAT SERPL-MCNC: 1.09 MG/DL — SIGNIFICANT CHANGE UP (ref 0.5–1.3)
GLUCOSE SERPL-MCNC: 140 MG/DL — HIGH (ref 70–99)
HCT VFR BLD CALC: 34.9 % — SIGNIFICANT CHANGE UP (ref 34.5–45)
HGB BLD-MCNC: 11.8 G/DL — SIGNIFICANT CHANGE UP (ref 11.5–15.5)
MCHC RBC-ENTMCNC: 30.3 PG — SIGNIFICANT CHANGE UP (ref 27–34)
MCHC RBC-ENTMCNC: 33.8 GM/DL — SIGNIFICANT CHANGE UP (ref 32–36)
MCV RBC AUTO: 89.7 FL — SIGNIFICANT CHANGE UP (ref 80–100)
NRBC # BLD: 0 /100 WBCS — SIGNIFICANT CHANGE UP (ref 0–0)
PLATELET # BLD AUTO: 202 K/UL — SIGNIFICANT CHANGE UP (ref 150–400)
POTASSIUM SERPL-MCNC: 4.9 MMOL/L — SIGNIFICANT CHANGE UP (ref 3.5–5.3)
POTASSIUM SERPL-SCNC: 4.9 MMOL/L — SIGNIFICANT CHANGE UP (ref 3.5–5.3)
RBC # BLD: 3.89 M/UL — SIGNIFICANT CHANGE UP (ref 3.8–5.2)
RBC # FLD: 13.2 % — SIGNIFICANT CHANGE UP (ref 10.3–14.5)
SODIUM SERPL-SCNC: 136 MMOL/L — SIGNIFICANT CHANGE UP (ref 135–145)
WBC # BLD: 12.76 K/UL — HIGH (ref 3.8–10.5)
WBC # FLD AUTO: 12.76 K/UL — HIGH (ref 3.8–10.5)

## 2018-11-08 PROCEDURE — 80048 BASIC METABOLIC PNL TOTAL CA: CPT

## 2018-11-08 PROCEDURE — 88305 TISSUE EXAM BY PATHOLOGIST: CPT

## 2018-11-08 PROCEDURE — 97116 GAIT TRAINING THERAPY: CPT

## 2018-11-08 PROCEDURE — 88300 SURGICAL PATH GROSS: CPT

## 2018-11-08 PROCEDURE — 99233 SBSQ HOSP IP/OBS HIGH 50: CPT

## 2018-11-08 PROCEDURE — 36415 COLL VENOUS BLD VENIPUNCTURE: CPT

## 2018-11-08 PROCEDURE — 97161 PT EVAL LOW COMPLEX 20 MIN: CPT

## 2018-11-08 PROCEDURE — 73562 X-RAY EXAM OF KNEE 3: CPT

## 2018-11-08 PROCEDURE — 97165 OT EVAL LOW COMPLEX 30 MIN: CPT

## 2018-11-08 PROCEDURE — C1889: CPT

## 2018-11-08 PROCEDURE — 97110 THERAPEUTIC EXERCISES: CPT

## 2018-11-08 PROCEDURE — 85027 COMPLETE CBC AUTOMATED: CPT

## 2018-11-08 PROCEDURE — 97535 SELF CARE MNGMENT TRAINING: CPT

## 2018-11-08 PROCEDURE — 97530 THERAPEUTIC ACTIVITIES: CPT

## 2018-11-08 PROCEDURE — 88311 DECALCIFY TISSUE: CPT

## 2018-11-08 RX ORDER — APIXABAN 2.5 MG/1
1 TABLET, FILM COATED ORAL
Qty: 24 | Refills: 0 | OUTPATIENT
Start: 2018-11-08 | End: 2018-11-19

## 2018-11-08 RX ORDER — FAMOTIDINE 10 MG/ML
0 INJECTION INTRAVENOUS
Qty: 0 | Refills: 0 | COMMUNITY

## 2018-11-08 RX ORDER — CELECOXIB 200 MG/1
1 CAPSULE ORAL
Qty: 60 | Refills: 0 | OUTPATIENT
Start: 2018-11-08 | End: 2018-12-07

## 2018-11-08 RX ORDER — SENNA PLUS 8.6 MG/1
2 TABLET ORAL
Qty: 0 | Refills: 0 | COMMUNITY
Start: 2018-11-08

## 2018-11-08 RX ORDER — POLYETHYLENE GLYCOL 3350 17 G/17G
17 POWDER, FOR SOLUTION ORAL
Qty: 0 | Refills: 0 | COMMUNITY
Start: 2018-11-08

## 2018-11-08 RX ORDER — ACETAMINOPHEN 500 MG
2 TABLET ORAL
Qty: 0 | Refills: 0 | COMMUNITY
Start: 2018-11-08

## 2018-11-08 RX ORDER — PANTOPRAZOLE SODIUM 20 MG/1
1 TABLET, DELAYED RELEASE ORAL
Qty: 30 | Refills: 1 | OUTPATIENT
Start: 2018-11-08 | End: 2019-01-06

## 2018-11-08 RX ORDER — OXYCODONE HYDROCHLORIDE 5 MG/1
1 TABLET ORAL
Qty: 42 | Refills: 0 | OUTPATIENT
Start: 2018-11-08 | End: 2018-11-14

## 2018-11-08 RX ORDER — DOCUSATE SODIUM 100 MG
1 CAPSULE ORAL
Qty: 0 | Refills: 0 | COMMUNITY
Start: 2018-11-08

## 2018-11-08 RX ADMIN — CELECOXIB 200 MILLIGRAM(S): 200 CAPSULE ORAL at 09:12

## 2018-11-08 RX ADMIN — Medication 1000 MILLIGRAM(S): at 02:53

## 2018-11-08 RX ADMIN — Medication 100 MILLIGRAM(S): at 00:10

## 2018-11-08 RX ADMIN — APIXABAN 2.5 MILLIGRAM(S): 2.5 TABLET, FILM COATED ORAL at 09:12

## 2018-11-08 RX ADMIN — PANTOPRAZOLE SODIUM 40 MILLIGRAM(S): 20 TABLET, DELAYED RELEASE ORAL at 06:12

## 2018-11-08 RX ADMIN — Medication 100 MILLIGRAM(S): at 06:12

## 2018-11-08 RX ADMIN — Medication 1000 MILLIGRAM(S): at 09:45

## 2018-11-08 RX ADMIN — CELECOXIB 200 MILLIGRAM(S): 200 CAPSULE ORAL at 09:45

## 2018-11-08 RX ADMIN — Medication 400 MILLIGRAM(S): at 01:53

## 2018-11-08 RX ADMIN — Medication 1000 MILLIGRAM(S): at 09:12

## 2018-11-08 NOTE — DISCHARGE NOTE ADULT - CARE PLAN
Principal Discharge DX:	Primary osteoarthritis of right knee  Goal:	Improve ambulation, ADLs and quality of life  Assessment and plan of treatment:	Physical Therapy/Occupational Therapy for ambulation, transfers, stairs, ADL's, Range of Motion Exercises, Isometrics.  Full weight bearing as tolerated with rolling walker  Range of Motion Goals: Flexion 120 degrees; Extension 0 degrees  Keep incision clean and dry.  Suture/prineo dressing removal 14 days after surgery at rehab facility or Surgeon's office  May shower post-op day #5 if no drainage from incision  Assessment and plan of treatment:	- Call your doctor if you experience:  • An increase in pain not controlled by pain medication or change in activity or  position.  • Temperature greater than 101° F.  • Redness, increased swelling or foul smelling drainage from or around the  incision.  • Numbness, tingling or a change in color or temperature of the operative leg.  • Call your doctor immediately if you experience chest pain, shortness of breath or calf pain.

## 2018-11-08 NOTE — PROGRESS NOTE ADULT - SUBJECTIVE AND OBJECTIVE BOX
Discharge medication calendar:  Eliquis 2.5mg q12h x 12 days then ASA EC 81mg q12h x 4 weeks  APAP 1000mg q8h x 2-3 weeks  Celecoxib 200mg q12h x 2-3 weeks  Pantoprazole 40mg QAM x 6 weeks  Narcotic PRN  Docusate 100mg TID while taking narcotic  Miralax, Senna, or Bisacodyl PRN for treatment of constipation

## 2018-11-08 NOTE — PROGRESS NOTE ADULT - ASSESSMENT
POD#1 s/p revision of RIGHT TKR (originally done July 2017)  - Pain controlled  - Bowel regimen  - PT/OT  - DVT PPx - Caprini 11 -> Eliquis  - stable for DC from medical perspective, wants to go home,  is always home, 2 steps to get in and can stay on first floor    HTN  - resume ACEI on POD#2  - hold HCTZ while on IVF    HLD  - resume statin    Chronic Aspirin Use  - takes TIW for primary prevention  - can hold while on Eliquis    Family Hx - Mother with PE  - no other family members with known VTE  - patient cannot recall circumstances around mother's PE  - no recent or current calf pain, cp, sob

## 2018-11-08 NOTE — DISCHARGE NOTE ADULT - INSTRUCTIONS
For Constipation :   • Increase your water intake. Drink at least 8 glasses of water daily.  • Try adding fiber to your diet by eating fruits, vegetables and foods that are rich in grains.  • If you do experience constipation, you may take an over-the-counter stool softener/laxative such as Jil Colace, Senekot or  Milk of Magnesia.

## 2018-11-08 NOTE — PROGRESS NOTE ADULT - SUBJECTIVE AND OBJECTIVE BOX
POST OPERATIVE DAY #: 1    72y Female  s/p Revision  Right  TKA, bearing change                SUBJECTIVE: Patient seen and examined at bedside.     Pain:  well controlled      Pain scale:  2 /10  Denies CP, SOB, N/V/D, weakness, numbness   No new complains     OBJECTIVE:     Vital Signs Last 24 Hrs  T(C): 36.6 (08 Nov 2018 07:03), Max: 37 (07 Nov 2018 15:02)  T(F): 97.9 (08 Nov 2018 07:03), Max: 98.6 (07 Nov 2018 15:02)  HR: 53 (08 Nov 2018 07:03) (51 - 85)  BP: 133/68 (08 Nov 2018 07:03) (102/70 - 133/68)  BP(mean): --  RR: 15 (08 Nov 2018 07:03) (14 - 16)  SpO2: 99% (08 Nov 2018 07:03) (93% - 99%)    Affected extremity: RLE         Dressing: changed, incision clean/dry/intact                              Sensation: intact to light touch          Motor exam:  5 / 5 Tibialis Anterior/Gastrocnemius-Soleus, EHL/FHL         warm, well-perfused; capillary refill < 3 seconds         negative calf tenderness B/L LE  LABS:                        11.8   12.76 )-----------( 202      ( 08 Nov 2018 07:09 )             34.9     11-08    136  |  104  |  21  ----------------------------<  140<H>  4.9   |  22  |  1.09    Ca    9.1      08 Nov 2018 07:09          MEDICATIONS:      Pain management:  acetaminophen   Tablet .. 1000 milliGRAM(s) Oral every 8 hours  celecoxib 200 milliGRAM(s) Oral two times a day  HYDROmorphone  Injectable 0.5 milliGRAM(s) IV Push every 3 hours PRN  ondansetron Injectable 4 milliGRAM(s) IV Push every 6 hours PRN  oxyCODONE    IR 5 milliGRAM(s) Oral every 3 hours PRN  oxyCODONE    IR 10 milliGRAM(s) Oral every 3 hours PRN    DVT prophylaxis:   apixaban 2.5 milliGRAM(s) Oral every 12 hours      RADIOLOGY & ADDITIONAL STUDIES:    ASSESSMENT AND PLAN:     - Analgesic pain control  - DVT prophylaxis:  Eliquis2.5mg twice a day     SCDs       - Pain Management: Celebrex 200mg twice a day x 21 days   - PT/OT: Weight Bearing Status:  Weight bearing as tolerated, OOBTC           -  Incentive spirometry  - IVF  - Advance diet as tolerated  - Hospitalist is following  -  Follow up labs  -  Disposition: Home

## 2018-11-08 NOTE — DISCHARGE NOTE ADULT - PLAN OF CARE
Improve ambulation, ADLs and quality of life Physical Therapy/Occupational Therapy for ambulation, transfers, stairs, ADL's, Range of Motion Exercises, Isometrics.  Full weight bearing as tolerated with rolling walker  Range of Motion Goals: Flexion 120 degrees; Extension 0 degrees  Keep incision clean and dry.  Suture/prineo dressing removal 14 days after surgery at rehab facility or Surgeon's office  May shower post-op day #5 if no drainage from incision - Call your doctor if you experience:  • An increase in pain not controlled by pain medication or change in activity or  position.  • Temperature greater than 101° F.  • Redness, increased swelling or foul smelling drainage from or around the  incision.  • Numbness, tingling or a change in color or temperature of the operative leg.  • Call your doctor immediately if you experience chest pain, shortness of breath or calf pain.

## 2018-11-08 NOTE — DISCHARGE NOTE ADULT - PATIENT PORTAL LINK FT
You can access the [facility] Patient Portal, offered by [facility], by registering with the following website: [email or web address]

## 2018-11-08 NOTE — PROGRESS NOTE ADULT - SUBJECTIVE AND OBJECTIVE BOX
INTERVAL HPI/OVERNIGHT EVENTS:   Patient seen and examined.  Eating, voiding, had small BM yesterday after surgery.  No fevers, chills, sweats, dizziness, HA, changes in vision, cp, palpitations, sob, persistent cough, n/v/d, abd pain, dysuria, focal weakness, or calf pain.      REVIEW OF SYSTEMS:  See HPI,  all others negative    PHYSICAL EXAM:  Vital Signs Last 24 Hrs  T(C): 36.6 (08 Nov 2018 07:03), Max: 37 (07 Nov 2018 15:02)  T(F): 97.9 (08 Nov 2018 07:03), Max: 98.6 (07 Nov 2018 15:02)  HR: 53 (08 Nov 2018 07:03) (51 - 89)  BP: 133/68 (08 Nov 2018 07:03) (95/57 - 133/68)  BP(mean): --  RR: 15 (08 Nov 2018 07:03) (14 - 16)  SpO2: 99% (08 Nov 2018 07:03) (93% - 99%)    GENERAL: NAD, well-groomed, well-developed, awake, alert, oriented x 3, fluent and coherent speech  EYES: EOMI, PERRLA, conjunctiva and sclera clear  ENMT: No tonsillar erythema, exudates, or enlargement; Moist mucous membranes, Good dentition, No lesions  NECK: Supple, No JVD, No Cervical LAD, No thyromegaly, No thyroid nodules felt  NERVOUS SYSTEM:  Good concentration; Moving all 4 extremities; No gross sensory deficits, No facial droop  CHEST/LUNG: Clear to auscultation bilaterally; No rales, rhonchi, wheezing, or rubs  HEART: Regular rate and rhythm; No murmurs, rubs, or gallops  ABDOMEN: Soft, Nontender, Nondistended, Bowel sounds present, No palpable masses or organomegaly, No bruits  EXTREMITIES:  2+ Peripheral Pulses, No clubbing, cyanosis, or edema  INCISION: dressing c/d/i    LABS:                        11.8   12.76 )-----------( 202      ( 08 Nov 2018 07:09 )             34.9     08 Nov 2018 07:09    136    |  104    |  21     ----------------------------<  140    4.9     |  22     |  1.09     Ca    9.1        08 Nov 2018 07:09             RADIOLOGY & ADDITIONAL TESTS:

## 2018-11-08 NOTE — DISCHARGE NOTE ADULT - HOME CARE AGENCY
[facility] - [phone]/ [phone]  Registered Nurse to visit the day after hospital discharge; Physical Therapist to follow. Please contact the home care agency at the above phone number if you have not heard from them by 12 noon on the day after your hospital discharge.

## 2018-11-08 NOTE — DISCHARGE NOTE ADULT - REASON FOR ADMISSION
Patient is a 72y old  Female who presents with a chief complaint of Patient is a 72y old  Female who presents with a chief complaint of right knee instability (07 Nov 2018 09:17) Revision Right TKR

## 2018-11-08 NOTE — DISCHARGE NOTE ADULT - MEDICATION SUMMARY - MEDICATIONS TO STOP TAKING
I will STOP taking the medications listed below when I get home from the hospital:    aspirin 81 mg oral delayed release tablet  -- 1 tab(s) by mouth 3 times a week

## 2018-11-08 NOTE — PROGRESS NOTE ADULT - REASON FOR ADMISSION
Patient is a 72y old  Female who presents with a chief complaint of right knee instability
Patient is a 72y old  Female who presents with a chief complaint of Patient is a 72y old  Female who presents with a chief complaint of right knee instability (07 Nov 2018 09:17)

## 2018-11-08 NOTE — DISCHARGE NOTE ADULT - CARE PROVIDER_API CALL
Willie Palacios), Orthopaedic Surgery  [address] [place] [address]  Phone: [phone]  Fax: [phone]

## 2018-11-08 NOTE — PHARMACOTHERAPY INTERVENTION NOTE - COMMENTS
Transition of Care education at bedside - medication calendar given to patient. Pharmacy fill confirmed.
admission med rec POD1

## 2018-11-08 NOTE — DISCHARGE NOTE ADULT - HOSPITAL COURSE
This patient was admitted to [facility] with a history of severe degenerative joint disease of the right knee, h/o right TKR.  Patient went to Pre-Surgical Testing at [facility] and was medically cleared to undergo elective procedure. Revision Right TKR performed on 11/7/18 by Dr. Monahan. No operative or kyler-operative complications arose during patients hospital course.  Patient received antibiotic according to SCIP guidelines for infection prevention.  Eliquis was given for DVT prophylaxis.  Anesthesia, Medical Hospitalist, Physical Therapy and Occupational Therapy were consulted. Patient is stable for discharge with a good prognosis.  Appropriate discharge instructions and medications are provided in this document.

## 2018-11-08 NOTE — DISCHARGE NOTE ADULT - NSFTFSERV1RD_GEN_ALL_CORE
wound care and assessment/rehabilitation services/teaching and training/medication teaching and assessment/observation and assessment

## 2018-11-26 ENCOUNTER — APPOINTMENT (OUTPATIENT)
Dept: ORTHOPEDIC SURGERY | Facility: CLINIC | Age: 73
End: 2018-11-26
Payer: MEDICARE

## 2018-11-26 VITALS
SYSTOLIC BLOOD PRESSURE: 142 MMHG | HEART RATE: 72 BPM | DIASTOLIC BLOOD PRESSURE: 82 MMHG | HEIGHT: 60 IN | BODY MASS INDEX: 33.38 KG/M2 | WEIGHT: 170 LBS

## 2018-11-26 PROCEDURE — 73562 X-RAY EXAM OF KNEE 3: CPT | Mod: RT

## 2018-11-26 PROCEDURE — 99024 POSTOP FOLLOW-UP VISIT: CPT

## 2019-01-15 ENCOUNTER — APPOINTMENT (OUTPATIENT)
Dept: ORTHOPEDIC SURGERY | Facility: CLINIC | Age: 74
End: 2019-01-15
Payer: MEDICARE

## 2019-01-15 VITALS
BODY MASS INDEX: 32.98 KG/M2 | DIASTOLIC BLOOD PRESSURE: 83 MMHG | HEIGHT: 60 IN | WEIGHT: 168 LBS | SYSTOLIC BLOOD PRESSURE: 119 MMHG | HEART RATE: 76 BPM

## 2019-01-15 DIAGNOSIS — M62.81 MUSCLE WEAKNESS (GENERALIZED): ICD-10-CM

## 2019-01-15 PROCEDURE — 73562 X-RAY EXAM OF KNEE 3: CPT | Mod: RT

## 2019-01-15 PROCEDURE — 99024 POSTOP FOLLOW-UP VISIT: CPT

## 2019-01-17 NOTE — HISTORY OF PRESENT ILLNESS
[Doing Well] : is doing well [Excellent Pain Control] : has excellent pain control [No Sign of Infection] : is showing no signs of infection [de-identified] : S/P Revision Right TKR [de-identified] : Established patient, she presents today for interval office postop visit after revision Right TKR 18, change of tibial bearing to thicker size and constrained (VVC) design. No fall or knee injury. No incisional problems. Doing well on OP PT, noting improvements in walking, knee strength, & ADLs.\par Off cane now, off ASA, Off analgesics. No knee buckling now from Pre-Op.\par Does note some intermittent ill descript discomfort alike a burning lower pole of incision down medial side Right shin up to ankle, noted mostly with distance walking, and long periods of standing, and some times while sitting. Noted x 3-4 weeks. He states has H/O LSS stenosis and right Sciatica in the past\par Location: right knee, shin\par Duration: Total:  Recent: Post-Op\par (KSS) Severity/VAS: Rest: 1/10 ; With activity: 3-4/10\par (KSS)Pain Assessment:  Mild - Occasional Walking\par Timin-5 D/Week\par Context: ongoing\par Modifying factors: walking standing\par Limp:\par (KSS)Assistive device: None used\par Difficulties:\par (KSS)Walking(pain/difficulty): No  > 5 blocks \par (KSS)Stairs:  Up & down with rail \par Other Tx: None\par  [de-identified] : General/Constitutional: Well appearing,obese 73year old F in no apparent distress; height and weight as detailed below; vital signs as detailed below\par Neuro:\par Orientation/Mental Status: Awake, alert, oriented to time, place, & person.\par Balance: Single leg balance fair - intact on Left / Right @ 10 sec.\par Sensation: intact to fine & deep touch bilat. Feet/toes; \par EHL/AT(Peroneal N.): Bilat: 5/5 \par \par Vascular: DP: Bilat: 2/3 ; \par Cap refill 1-2 sec. all toes\par Calves non tender bilat; No Tami's Sx Bilat.\par Lymph: No enlarged LN in the popliteal chain bilaterally.\par Skin(LE): No cellulitis, edema, and min. venous varicosities bilaterally \par MS:\par Gait: \par Stable heel/toe stride w/o device\par \par Function: There is no / min.  difficulty mounting the exam table & no - min. difficulty arising from a chair without arm use. \par \par Right  Knee:\par Swelling: min STS Post-Op\par Effusion: 0\par Alignment: Neutral \par Flexion Contracture:  None \par Tenderness: 0\par Incision: midline, well healed\par Skin Temp: slightly warm\par ROM: Flexion: [130] deg.; Extension: [-8 > 0] deg,\par Laxity A-P:  0 in extension & flexion\par Laxity M-L:  0 in extension & flexion\par PF crepitus: 1+ ; without pain\par Quadricep formation:  attenuated in Extension & Flexion:\par Quad/Ham St: 3-4/5\par  [de-identified] : Radiographs of the [Right] knee were performed today. There are stable interfaces between bone, cement, and implant in all 3 components. There is stable positioning of the femoral, tibial, and patellar components. There is equidistant spacing on each side of the tibial bearing. There is no fracture, foreign body, subsidement, osteolysis, or notable effusion. The patellar component is tracking centrally in the trochlear groove of the femoral component. Alignment = [ 7 ] deg. Valgus [de-identified] :  [ Philip ] evaluated this patient, reviewed the radiographs, and is guiding the patient's orthopedic management. \par The patient was advised to continue their routine activities of daily living within tolerances, home exercises as guided by P.T., and continue outpatient PT till goals are achieved.\par Instructions for LE strengthening, ROM, and balance exercises were reviewed and demonstrated for performance on bed and kitchen counter front at home. \par The benefits of regular exercise such as distance walking, exercise bike, and pool walking were reviewed from an Orthopedic and general health standpoint.\par A prescription was given for continued out-patient   PT modalities for post total knee protocol, Le strengthening, and ROM.\par She was advised that her knee/shin  pain may be part of her Post-Op status, however consideration should be given to LSS stenosis radicular symptoms. She may utilize the individual she saw prior for her Spine evaluation, utilize a Neurology consult from her PCP, or utilize Dr. Shelley & David in this practice. Contact info given.\par The patient was advised to continue with antibiotic prophylaxis for dental procedures and regular interval orthopedic follow-up post Total Knee Arthroplasty.\par They may contact our office if any new problems arise for urgent orthopedic re-evaluation. \par

## 2019-01-17 NOTE — END OF VISIT
[FreeTextEntry3] : I saw and evaluated the patient. I discussed and reviewed the case details with the PA and agree with the PA's findings and plan as documented in the PA note.

## 2019-11-18 ENCOUNTER — APPOINTMENT (OUTPATIENT)
Dept: ORTHOPEDIC SURGERY | Facility: CLINIC | Age: 74
End: 2019-11-18
Payer: MEDICARE

## 2019-11-18 VITALS — SYSTOLIC BLOOD PRESSURE: 114 MMHG | DIASTOLIC BLOOD PRESSURE: 81 MMHG | HEART RATE: 81 BPM

## 2019-11-18 DIAGNOSIS — Z96.651 PRESENCE OF RIGHT ARTIFICIAL KNEE JOINT: ICD-10-CM

## 2019-11-18 PROCEDURE — 99213 OFFICE O/P EST LOW 20 MIN: CPT

## 2019-11-18 PROCEDURE — 73562 X-RAY EXAM OF KNEE 3: CPT | Mod: 26,RT

## 2020-05-20 NOTE — H&P PST ADULT - FUNCTIONAL LEVEL PRIOR: TOILETING
Intermediate Repair Preamble Text (Leave Blank If You Do Not Want): Undermining was performed with blunt dissection. (0) independent

## 2020-06-08 NOTE — PATIENT PROFILE ADULT. - NS PRO INFO GIVEN TO
Spoke to REBA Rubin at [facility] with an update, aware patient will be admitted to [facility] tonight. No further questions at completion of call.     Reached at: [phone]   patient

## 2021-02-16 NOTE — PATIENT PROFILE ADULT - VISION (WITH CORRECTIVE LENSES IF THE PATIENT USUALLY WEARS THEM):
Shirin Vanessa is an established  77 year old female   Primary Care Physician: Tha Dye MD      Chief Complaint   Patient presents with   • Office Visit   • Skin Assessment        Personal history of skin cancer: yes  She has prior history of melanoma 0.48mm, clarks IV, mitoses > 1/mm2, no ulceration, L forearm status post WLE 4/10. sentinal nodes x 2 were negative per patient.     Family history of skin cancer or melanoma: no    Pharmacy/Refills: [facility] short term, [facility] long term    Patient would like communication of their results via:    Cell: [phone]  Okay to leave a message containing results? Yes    Denies known Latex allergy or symptoms of Latex sensitivity.  Medication and allergy lists verified with pt, no changes.   Tobacco reviewed.     No vitals obtained today per MD.   Partially impaired: cannot see medication labels or newsprint, but can see obstacles in path, and the surrounding layout; can count fingers at arm's length

## 2021-05-30 NOTE — DISCHARGE NOTE ADULT - MEDICATION SUMMARY - MEDICATIONS TO CHANGE
Patient: Chivo Kowalski Date: 2021   : 1946 Attending: Chung Woods MD   75 year old male Room: 233/01     Chief Complaint: chest pain, STEMI, cardiac arrest  Post-op Day #: 11  Surgical Procedure: emergent CABG x 4 (LIMA -> LAD, SVG -> OM2, SVG -> OM1 as a Y-graft off of SVG to OM2, SVG -> PDA) and right greater saphenous endoscopic vein harvesting    21 - IABP removed    Subjective: up to wheelchair, no complaints    Vital Last Value 24 Hour Range   Temperature 97.9 °F (36.6 °C) (21 0804) Temp  Min: 97.5 °F (36.4 °C)  Max: 98.1 °F (36.7 °C)   Pulse 70 (21 0917) Pulse  Min: 55  Max: 72   Respiratory Rate (!) 22 (21 0804) Resp  Min: 20  Max: 24   Non-Invasive   Blood Pressure 123/67 (21 0917) BP  Min: 101/56  Max: 137/71   Arterial  Blood Pressure (!) 144/67 (21 1300) No data recorded   Pulse Oximetry 98 % (21 0802) SpO2  Min: 94 %  Max: 100 %     Oxygen: RA, CPAP at HS and prn    Weight over the past 48 Hours:   Patient Vitals for the past 48 hrs:   Weight   21 0500 122.8 kg   21 0243 123.7 kg      Admit Weight:   Weight: 111 kg (21 2200)  BMI:   BMI (Calculated): 38.33 (21)    Intake/Output:    Last Stool Occurrence: 1 (21 1000)    I/O this shift:  In: 153 [NG/GT:153]  Out: 300 [Urine:300]    I/O last 3 completed shifts:  In: 2523 [P.O.:691; NG/GT:1632; IV Piggyback:200]  Out: 1255 [Urine:1255]      Intake/Output Summary (Last 24 hours) at 2021 1037  Last data filed at 2021 1000  Gross per 24 hour   Intake 2676 ml   Output 1255 ml   Net 1421 ml     Rhythm: Sinus rhythm     Physical Exam:   Heart: Regular rate and rhythm and S1, S2 normal  Lungs: Diminished breath sounds  bibasilar  Abdomen: obese, semi-firm, normoactive bowel sounds, post-pyloric TF in place  Incision(s): Sternal Clean, dry and intact, Right leg Clean, dry and intact and grossly ecchymotic and Left groin IABP site soft, non-tender,  ecchymotic  Neurologic: Alert and oriented to person, place and time, Tongue midline and Hand grasp equal bilaterally, forgetful at times  Extremities: edema generalized, 1+ bilateral pedal pulses    Laboratory Results:    Recent Labs   Lab 05/30/21  0516 05/29/21  0404 05/28/21  0427 05/27/21  0453 05/26/21  0439 05/25/21  0526 05/24/21  0410   SODIUM  --   --  143 145 150* 146* 144   POTASSIUM 3.7 3.6 3.7 3.3* 4.4 3.8 3.7   CHLORIDE  --   --  110* 109* 113* 110* 106   CO2  --   --  24 25 27 26 26   BUN  --   --   --  23* 29* 28* 25*   CREATININE  --   --   --  0.97 1.15 1.34* 1.24*   GLUCOSE  --   --   --  156* 148* 124* 124*   MG 2.2 2.3  --  2.3 2.4 2.4 2.0   WBC  --   --  12.9* 12.8* 14.0* 12.7* 11.9*   HGB  --   --  9.4* 9.0* 9.1* 8.6* 7.9*   HCT  --   --  30.4* 28.9* 29.2* 26.9* 25.1*   PLT  --   --  376 331 273 201 143   BILIRUBIN  --   --   --   --   --  0.8 0.9   ALKPT  --   --   --   --   --  70 57   AST  --   --   --   --   --  60* 71*   GPT  --   --   --   --   --  45 41     ABG    Recent Labs   Lab 05/24/21  0120   APH 7.45   APO2 93   AHCO3 26     Cultures: Reviewed Normal    Chest X-Ray: Reviewed    Medications/Infusions:  Scheduled:   • famotidine  20 mg Oral 2 times per day   • acetaminophen  975 mg Oral 4 times per day   • AMIODarone  200 mg Oral 2 times per day   • aspirin  81 mg Oral Daily   • atorvastatin  80 mg Oral Nightly   • lisinopril  5 mg Oral Daily   • melatonin  6 mg Oral Nightly   • metoPROLOL tartrate  25 mg Oral 2 times per day   • QUEtiapine  25 mg Oral Nightly   • ampicillin-sulbactam (UNASYN) IVPB  3 g Intravenous 4 times per day   • ipratropium  0.5 mg Nebulization 4x Daily Resp   • heparin (porcine)  5,000 Units Subcutaneous 3 times per day   • insulin glargine  10 Units Subcutaneous Nightly   • sodium chloride (PF)  2 mL Intracatheter 2 times per day   • insulin regular (human)   Subcutaneous 4 times per day   • FLUoxetine  10 mg Oral Daily   • docusate sodium-sennosides  2  tablet Oral Daily   • polyethylene glycol  17 g Oral BID   • budesonide  0.5 mg Nebulization BID Resp   • formoterol  20 mcg Nebulization BID Resp       CMS Criteria:  ASA: Yes    BB: Yes    Statin:Yes    ACE: Yes    Surgical Care Improvement Project:  Gould in Place: No    DVT/VTE Prophylaxis:  VTE Pharmacologic Prophylaxis: Yes SQ heparin  VTE Mechanical Prophylaxis: Yes    Antibiotics D/C'd within 24 hours of surgery end: Yes    Beta Blocker: Yes     Surgical Central Line: No    Cardiologist: Dr. Real  EF: 66%  Nares: not done  Hemoglobin A1c: 7.8  Preoperative Creatinine: 1.10    LIMITED ECHO 5/26/21:  Final Impressions:  Limited echo or Follow up with Definity contrast.  Technically difficult study.  Left ventricular ejection fraction, 66 %.  Abnormal (paradoxical) septal motion consistent with postoperative status.  Mildly decreased right ventricular systolic function.  No pericardial effusion.    STS Risk of Mortality: 20.342%    Assessment/Plan:  S/P emergent CABG x 4 (LIMA -> LAD, SVG -> OM2, SVG -> OM1 as a Y-graft off of SVG to OM2, SVG -> PDA) and right greater saphenous EVH - ASA, statin, BB.     STEMI (5/19/21) - DAPT at discharge, high intensity statin. Postop limited echo with preserved LVEF.     Cardiac arrest/V-fib/VT - secondary to above and shocked multiple times during EMS transport and cath lab. No further arrhythmias, continue po amiodarone and BB.     HTN - tolerating metoprolol 25 BID, Continue ACEi.    Acute postop pulmonary insufficiency/Suspected STIVEN - continue to increase activity, IS. CPAP at HS and prn. Continue Pulmicort, formoterol, Atrovent, and Mucomyst nebulizers. Sleep study as outpatient when appropriate. Appreciate Critical Care.     Fevers/Leukocytosis/Suspected aspiration pneumonia - afebrile, WBC's stable, cultures negative. Unasyn through 5/30/21 per CC.    Hypervolemia - IV lasix today    Type 2 DM - controlled with Lantus, SSI. Resume home meds closer to  discharge.    Psoriatic arthritis - on methotrexate and Humira PTA, continue to hold.     Acute postop blood loss anemia - stable and above transfusion criteria, monitor.     Dysphagia - modified diet per ST, appreciate input.    Moderate protein calorie malnutrition - modified diet per ST and tolerating enteral tube feeds at goal via post-pyloric Dobhoff. Cap tube feed today, initiate calorie count.     Acute postoperative ICU delirium - improving, still forgetful at times. Continue Seroquel, maintain sitter at bedside, normalize sleep/wake cycle, scheduled Tylenol and melatonin, prn tramadol for severe breakthrough pain.      Diarrhea - improving. C diff negative (5/27), likely related to enteral feeds, continue banana flakes.     Deconditioning - PT, OT, ST, and SW following. Consult IRP, Dr. Ansari, discharge Tuesday/wednesday.    Med/surg status.     Pathways:  Wires Out: No  Ambulating: Yes  Coumadin: Not Applicable    Discussed with or notes reviewed:  Patient, RN and MD    Discharge Disposition: CINDY MONDRAGON  [facility]  Cardiovascular & Thoracic Surgery   Pager: [phone]          I will SWITCH the dose or number of times a day I take the medications listed below when I get home from the hospital:  None

## 2022-03-30 NOTE — DISCHARGE NOTE ADULT - MEDICATION SUMMARY - MEDICATIONS TO TAKE
No I will START or STAY ON the medications listed below when I get home from the hospital:    acetaminophen 500 mg oral tablet  -- 2 tab(s) by mouth every 8 hours for 2 to 3 weeks.  -- Indication: For Pain    oxyCODONE 5 mg oral tablet  -- 1 tab(s) by mouth every 4 hours, As Needed -Mild Pain (1 - 3) - for moderate pain MDD:6  -- Indication: For Pain    celecoxib 200 mg oral capsule  -- 1 cap(s) by mouth 2 times a day  -- Indication: For Pain    lisinopril 40 mg oral tablet  -- 1 tab(s) by mouth once a day  -- Indication: For HTN    apixaban 2.5 mg oral tablet  -- 1 tab(s) by mouth every 12 hours  -- Indication: For DVT ppx    simvastatin 80 mg oral tablet  -- 1 tab(s) by mouth once a day (at bedtime)  -- Indication: For HLD    hydroCHLOROthiazide 12.5 mg oral capsule  -- 1 cap(s) by mouth once a day  -- Indication: For HTN    senna oral tablet  -- 2 tab(s) by mouth once a day (at bedtime)  -- Indication: For constipation    docusate sodium 100 mg oral capsule  -- 1 cap(s) by mouth 3 times a day  -- Indication: For constipstion    polyethylene glycol 3350 oral powder for reconstitution  -- 17 gram(s) by mouth once a day, As needed, Constipation  -- Indication: For constipation    pantoprazole 40 mg oral delayed release tablet  -- 1 tab(s) by mouth once a day  -- Indication: For acid reflux    Calcium 600+D oral tablet  --  by mouth once a day  -- Indication: For vitamin

## 2022-08-08 ENCOUNTER — APPOINTMENT (OUTPATIENT)
Dept: ORTHOPEDIC SURGERY | Facility: CLINIC | Age: 77
End: 2022-08-08

## 2022-08-08 ENCOUNTER — NON-APPOINTMENT (OUTPATIENT)
Age: 77
End: 2022-08-08

## 2022-08-08 VITALS
HEART RATE: 80 BPM | BODY MASS INDEX: 31.22 KG/M2 | WEIGHT: 159 LBS | SYSTOLIC BLOOD PRESSURE: 138 MMHG | HEIGHT: 60 IN | DIASTOLIC BLOOD PRESSURE: 74 MMHG

## 2022-08-08 DIAGNOSIS — M54.16 RADICULOPATHY, LUMBAR REGION: ICD-10-CM

## 2022-08-08 DIAGNOSIS — M54.50 LOW BACK PAIN, UNSPECIFIED: ICD-10-CM

## 2022-08-08 PROCEDURE — 72170 X-RAY EXAM OF PELVIS: CPT

## 2022-08-08 PROCEDURE — 99214 OFFICE O/P EST MOD 30 MIN: CPT

## 2022-08-08 PROCEDURE — 72110 X-RAY EXAM L-2 SPINE 4/>VWS: CPT

## 2022-08-08 RX ORDER — OMEPRAZOLE 40 MG/1
40 CAPSULE, DELAYED RELEASE ORAL
Qty: 30 | Refills: 0 | Status: ACTIVE | COMMUNITY
Start: 2022-05-19

## 2022-08-10 ENCOUNTER — APPOINTMENT (OUTPATIENT)
Dept: MRI IMAGING | Facility: IMAGING CENTER | Age: 77
End: 2022-08-10

## 2022-08-10 ENCOUNTER — OUTPATIENT (OUTPATIENT)
Dept: OUTPATIENT SERVICES | Facility: HOSPITAL | Age: 77
LOS: 1 days | End: 2022-08-10
Payer: MEDICARE

## 2022-08-10 DIAGNOSIS — Z98.890 OTHER SPECIFIED POSTPROCEDURAL STATES: Chronic | ICD-10-CM

## 2022-08-10 DIAGNOSIS — Z96.659 PRESENCE OF UNSPECIFIED ARTIFICIAL KNEE JOINT: Chronic | ICD-10-CM

## 2022-08-10 DIAGNOSIS — M54.16 RADICULOPATHY, LUMBAR REGION: ICD-10-CM

## 2022-08-10 DIAGNOSIS — Z96.651 PRESENCE OF RIGHT ARTIFICIAL KNEE JOINT: Chronic | ICD-10-CM

## 2022-08-10 DIAGNOSIS — E89.2 POSTPROCEDURAL HYPOPARATHYROIDISM: Chronic | ICD-10-CM

## 2022-08-10 PROCEDURE — 72148 MRI LUMBAR SPINE W/O DYE: CPT | Mod: 26,MH

## 2022-08-10 PROCEDURE — 72148 MRI LUMBAR SPINE W/O DYE: CPT

## 2022-08-10 NOTE — PHYSICAL EXAM
[Normal] : normal [Limited] : is limited [] : Motor: [NL] : Motor strength of the left lower extremity was normal [Motor Strength Lower Extremities] : right (weak) [UE/LE] : Sensory: Intact in bilateral upper & lower extremities [0] : right ankle jerk 0 [2+] : left ankle jerk 2+ [DP] : dorsalis pedis 2+ and symmetric bilaterally [PT] : posterior tibial 2+ and symmetric bilaterally [Poor Appearance] : well-appearing [Acute Distress] : not in acute distress [Obese] : not obese [Abl Mood] : in a normal mood [Abl Affect] : with normal affect [Poor Coordination] : normal coordination [Disorientation] : oriented x 3 [Painful] : not painful [Lawton's Sign] : negative Lawton's sign [SLR] : negative straight leg raise [Plantar Reflex Right Only] : absent on the right [Plantar Reflex Left Only] : absent on the left [DTR Reflexes Clonus Of Right Ankle (___ Beats)] : absent on the right [DTR Reflexes Clonus Of Left Ankle (___ Beats)] : absent on the left [FreeTextEntry2] : The pt is awake, alert and oriented to self, place and time, is comfortable and in no acute distress. Gait examination reveals a narrow based, non-ataxic, non-antalgic gait. Can heel and toe walk without difficulty. Inspection of neck, back and lower extremities bilaterally reveals no rashes or ecchymotic lesions.  There is no obvious abnormal spinal curvature in the sagittal and coronal planes. There is no tenderness over the cervical, thoracic or lumbar spine, or the paraspinal or upper and lower extremities musculature. There is no sacroiliac tenderness. No greater trochanteric tenderness bilaterally. No atrophy or abnormal movements noted in the upper or lower extremities. There is no swelling noted in the upper or lower extremities bilaterally. No cervical lymphadenopathy noted anteriorly. No joint laxity noted in the upper and lower extremity joints bilaterally.\par Lumbar spine range of motion is pain free with forward flexion to Mid tibia and extension of 30° with minimal discomfort.\par Negative straight leg raise to 45° in the sitting position bilaterally. There is no groin pain with hip internal rotation and a negative DES test bilaterally.  [de-identified] : seen with her \par healed bilateral knee incisions [de-identified] : 4 views lumbar spine obtained today demonstrate slight trunk shift to the right. Degenerative changes are seen at the L4-5 and L5-S1 facets bilaterally. On the lateral projection significant disc degeneration seen at L5-S1 and grade 1 spondylolisthesis L4-5. Some dynamic instability seen between flexion-extension with increased spondylolisthesis and flexion to grade 2 and improvement of grade one in extension. No acute fractures are noted. Degeneration seen in the lower thoracic spine visualized.\par \par AP pelvis x-ray obtained today demonstrates normal appearance of the hips bilaterally. No acute fractures noted.

## 2022-08-10 NOTE — DISCUSSION/SUMMARY
[Medication Risks Reviewed] : Medication risks reviewed [de-identified] : Patient presents with symptoms of back pain radiating down the right and left leg consistent with spinal stenosis.  In the setting of degenerative disc disease and spondylolisthesis lumbar spine recommended MRI lumbar spine for further evaluation.  Prescribed her physical therapy and gabapentin today.  She may continue use of Tylenol as needed.  She is reporting numbness of the right foot as well as right thigh stiffness consistent with lumbar radiculopathy.  Further treatment options after the MRI may include lumbar epidural steroid injection versus surgical intervention with laminectomy and fusion as appropriate.

## 2022-08-10 NOTE — HISTORY OF PRESENT ILLNESS
[Worsening] : worsening [6] : a current pain level of 6/10 [Prolonged Sitting] : worsened by prolonged sitting [Prolonged Standing] : worsened by prolonged standing [Acetaminophen] : relieved by acetaminophen [Heat] : relieved by heat [de-identified] : Patient presents here today for initial evaluation of low back pain for 1 month. Patient reports pain level today is 6/10 and worsens with prolonged sitting and repositioning sit to stand  with numbness and tingling on right foot and stiffness on right thigh.\par Bilateral TKR, right revision TKR\par Right thigh pain with tightness and pulling

## 2022-08-17 NOTE — H&P PST ADULT - EKG AND INTERPRETATION
Daily Note     Today's date: 2022  Patient name: Radha Garcia  : 1948  MRN: [id number]  Referring provider: Murphy Bowman DO  Dx:   Encounter Diagnosis     ICD-10-CM    1  Spinal stenosis of lumbar region, unspecified whether neurogenic claudication present  M48 061    2  Chronic bilateral low back pain, unspecified whether sciatica present  M54 50     G89 29                   Subjective: Pt states although he felt "fine" during last session, he c/o increased LBP for 2 days following  Pt also interested in acquiring a scooter, because he feels the longer he walks, his pain increases, and tends to radiate down to the glutes, LE's  Recommend pt schedule an appt w/ family doctor to discuss persistent sx  Objective: See treatment diary below      Assessment: Tolerated treatment fair  Patient would benefit from continued PT  Held on LTR due to increased lumbar sx when going to the R  No significant increase in sx noted w/ remaining exercises  Plan: Progress treatment as tolerated         Precautions: None          Manuals 8/3 8/10 8/17   7/6 7/13 RE    Laser to prox h/s  DC     20W 4:30 DC                                     Neuro Re-Ed           Quad sets            steamboats      RTB 10 ea BL 3 way     TA 10x5"      NV 10x5"   PPT 10x5" 10x5"     NV 20x 5"   TA with march 10 ea 10 ea 10 ea    NV    TA with iso abd/add  10x5" ea 10x5" ea  10x5" ea    NV 10x5" ea                                                                                                       Ther Ex           bike 5' lv 2 5' lv 2 5' lv2   5' 5'  5' lvl 2    Hamstring seated  NV 20"x3 ea     NV    Prone quad stretch strap 20"x3 ea      NV    glute bridge       2x10     Clamshells  NV 15 ea     NV    SLR flexion      1# 20     SLR abduction       1# 20     Knee ext machine      machine 22# x15 22# mahine 2x10     Ham curl machine      22# x15 22# 2x10     Leg press            Prone hamstring eccentric lowering GTB 2x10     TB side step       RTB 2 laps     Piriformis stretch bl            Ther Activity           Ball rolls fwd/side 10x fwd, 5x sides 10x fwd; 5x sides 10x fwd only    NV 10x fwd, 5x sides   Seated flexion stretch       NV    Down dogs at bar  15x5" 10x5"     NV 15x5"   DKTC w/ ball 10x3" 10x3" 10x5"    NV 10x    LTR with ball 10x3" ea 10x3" ea hold    NV 10x ea                                                                      Modalities                                    1:1 1230-100 left lower extremity findings NSR

## 2022-08-25 RX ORDER — MAGNESIUM CHLORIDE 64 MG
TABLET, DELAYED RELEASE (ENTERIC COATED) ORAL
Refills: 0 | Status: ACTIVE | COMMUNITY

## 2022-08-25 RX ORDER — ASCORBIC ACID 125 MG
TABLET,CHEWABLE ORAL
Refills: 0 | Status: ACTIVE | COMMUNITY

## 2022-08-31 ENCOUNTER — APPOINTMENT (OUTPATIENT)
Dept: ORTHOPEDIC SURGERY | Facility: CLINIC | Age: 77
End: 2022-08-31

## 2022-08-31 VITALS
BODY MASS INDEX: 31.02 KG/M2 | SYSTOLIC BLOOD PRESSURE: 133 MMHG | HEIGHT: 60 IN | WEIGHT: 158 LBS | DIASTOLIC BLOOD PRESSURE: 78 MMHG | HEART RATE: 77 BPM

## 2022-08-31 DIAGNOSIS — M51.36 OTHER INTERVERTEBRAL DISC DEGENERATION, LUMBAR REGION: ICD-10-CM

## 2022-08-31 DIAGNOSIS — M43.10 SPONDYLOLISTHESIS, SITE UNSPECIFIED: ICD-10-CM

## 2022-08-31 DIAGNOSIS — M48.062 SPINAL STENOSIS, LUMBAR REGION WITH NEUROGENIC CLAUDICATION: ICD-10-CM

## 2022-08-31 DIAGNOSIS — M51.37 OTHER INTERVERTEBRAL DISC DEGENERATION, LUMBOSACRAL REGION: ICD-10-CM

## 2022-08-31 PROCEDURE — 99214 OFFICE O/P EST MOD 30 MIN: CPT

## 2022-08-31 RX ORDER — GABAPENTIN 100 MG/1
100 CAPSULE ORAL
Qty: 30 | Refills: 2 | Status: DISCONTINUED | COMMUNITY
Start: 2022-08-08 | End: 2022-08-31

## 2022-08-31 NOTE — HISTORY OF PRESENT ILLNESS
[Worsening] : worsening [7] : a current pain level of 7/10 [Sitting] : sitting [Daily] : ~He/She~ states the symptoms seem to be occuring daily [Rest] : relieved by rest [de-identified] : Patient is here today to review mri lumbar spine 8/10/22. Patient states she took the gabapentin for several days then had to stop was making her feel off.\par Did not start PT yet. Has some stiffness in right hip and knee, trouble going up stairs with

## 2022-08-31 NOTE — REASON FOR VISIT
[Follow-Up Visit] : a follow-up visit for [Degenerative Joint Disease] : degenerative joint disease [Back Pain] : back pain [Radiculopathy] : radiculopathy [Spinal Stenosis] : spinal stenosis [Spondylolistheses] : spondylolistheses [Spouse] : spouse

## 2022-08-31 NOTE — PHYSICAL EXAM
[Normal] : normal [Limited] : is limited [] : Motor: [NL] : Motor strength of the left lower extremity was normal [Motor Strength Lower Extremities] : right (weak) [UE/LE] : Sensory: Intact in bilateral upper & lower extremities [0] : right ankle jerk 0 [2+] : left ankle jerk 2+ [DP] : dorsalis pedis 2+ and symmetric bilaterally [PT] : posterior tibial 2+ and symmetric bilaterally [de-identified] : EXAM: [id number] - MR SPINE LUMBAR - ORDERED BY: VIDYA GALDAMEZ\par \par PROCEDURE DATE: 08/10/2022\par \par INTERPRETATION: LUMBOSACRAL SPINE MRI\par \par CLINICAL INFORMATION: Back pain. Right thigh pain. Low back muscle strain.\par TECHNIQUE: Multiplanar, multisequence MR imaging was obtained of the lumbosacral spine.\par FINDINGS:\par \par DISC LEVEL EVALUATION:\par T11/T12: Small central protrusion indenting the thecal sac. No foraminal narrowing.\par T12/L1: Left paracentral protrusion. No spinal canal or foraminal narrowing.\par L1/L2: Small disc bulge resulting in indentation of the thecal sac. Minimal foraminal narrowing.\par L2/L3: Small disc bulge and bilateral facet arthrosis resulting in mild spinal canal narrowing and mild bilateral foraminal narrowing.\par L3/L4: Anterolisthesis with uncovering of the disc with small disc bulge and severe facet arthrosis resulting mild to moderate spinal canal narrowing, narrowing of the left lateral recess and mild to moderate bilateral foraminal narrowing.\par L4/L5: Anterolisthesis with uncovering of the disc with diffuse disc bulge and severe bilateral facet arthrosis resulting in severe spinal canal narrowing and moderate right and severe left foraminal narrowing.\par L5/S1: Disc bulge with posterior osseous ridging and bilateral facet arthrosis and prominence of the epidural fat resulting in mild spinal canal narrowing and moderate bilateral foraminal narrowing.\par \par SPINAL ALIGNMENT: Preservation of the vertebral body heights. Grade 1 anterolisthesis of L4 and L5. Mild retrolisthesis of L3 on L4 and trace retrolisthesis of L1 on L2 and L2 on L3.\par DISTAL CORD AND CONUS: Conus terminates at L1. Distal cord and cauda equina are unremarkable.\par SI JOINTS: Unremarkable.\par MARROW: Degenerative endplate edema at L5/S1. Degenerative facet edema on the right at L5/S1.\par PARASPINAL MUSCLE AND SOFT TISSUES: No high-grade paraspinal muscle atrophy.\par INTRAABDOMINAL/INTRAPELVIC SOFT TISSUES: Cholelithiasis.\par \par IMPRESSION:\par Lumbar spondylosis, most notably at L4/L5, resulting in severe spinal canal narrowing and moderate right and severe left foraminal narrowing.\par \par --- End of Report ---\par \par GABO FRIAS MD; Attending Radiologist\par This document has been electronically signed. Aug 15 2022 3:33PM [Poor Appearance] : well-appearing [Acute Distress] : not in acute distress [Obese] : not obese [Abl Mood] : in a normal mood [Abl Affect] : with normal affect [Poor Coordination] : normal coordination [Disorientation] : oriented x 3 [Painful] : not painful [Lawton's Sign] : negative Lawton's sign [SLR] : negative straight leg raise [Plantar Reflex Right Only] : absent on the right [Plantar Reflex Left Only] : absent on the left [DTR Reflexes Clonus Of Right Ankle (___ Beats)] : absent on the right [DTR Reflexes Clonus Of Left Ankle (___ Beats)] : absent on the left [FreeTextEntry2] : The pt is awake, alert and oriented to self, place and time, is comfortable and in no acute distress. Gait examination reveals a narrow based, non-ataxic, non-antalgic gait. Can heel and toe walk without difficulty. Inspection of neck, back and lower extremities bilaterally reveals no rashes or ecchymotic lesions.  There is no obvious abnormal spinal curvature in the sagittal and coronal planes. There is no tenderness over the cervical, thoracic or lumbar spine, or the paraspinal or upper and lower extremities musculature. There is no sacroiliac tenderness. No greater trochanteric tenderness bilaterally. No atrophy or abnormal movements noted in the upper or lower extremities. There is no swelling noted in the upper or lower extremities bilaterally. No cervical lymphadenopathy noted anteriorly. No joint laxity noted in the upper and lower extremity joints bilaterally.\par Lumbar spine range of motion is pain free with forward flexion to Mid tibia and extension of 30° with minimal discomfort.\par Negative straight leg raise to 45° in the sitting position bilaterally. There is no groin pain with hip internal rotation and a negative DES test bilaterally.  [de-identified] : seen with her \par healed bilateral knee incisions

## 2022-08-31 NOTE — DISCUSSION/SUMMARY
[Medication Risks Reviewed] : Medication risks reviewed [de-identified] : Will start PT, new Rx provided\par Will call for bilateral L4 SUZY if her posterior thigh and buttock pain persist.  For now she is not interested in any invasive treatments.\par For now also not interested in medications or surgery - laminectomy and fusion L4-S1 would be a long-term solution for her condition but she is not interested in that option for her.  She can discontinue gabapentin which she could not tolerate she may use over-the-counter NSAIDs\par \par MRI performed previously was independently reviewed by me and findings discussed with the patient and her .\par \par The patient was educated regarding their condition, treatment options as well as prescribed course of treatment. \par Risks and benefits as well as alternatives to the proposed treatment were also provided to the patient \par They were given the opportunity to have all their questions answered to their satisfaction.\par \par Vital signs were reviewed with the patient and the patient was instructed to followup with their primary care provider for further management. There were no PAs or scribes used in the evaluation, exam or treatment plan discussion. The surgeon was the primary evaluating or treating physician as noted above.

## 2022-11-05 NOTE — PROGRESS NOTE ADULT - SUBJECTIVE AND OBJECTIVE BOX
Pain well controlled.   Offers no other complaints      Constitutional: No fever, fatigue or weight loss.  Skin: No rash.  Eyes: No recent vision problems or eye pain.  ENT: No congestion, ear pain, or sore throat.  Endocrine: No thyroid problems.  Cardiovascular: No chest pain or palpation.  Respiratory: No cough, shortness of breath, congestion, or wheezing.  Gastrointestinal: No abdominal pain, nausea, vomiting, or diarrhea.  Genitourinary: No dysuria.  Musculoskeletal: No joint swelling.  Neurologic: No headache.      MEDICATIONS  (STANDING):  lactated ringers. 1000 milliLiter(s) (75 mL/Hr) IV Continuous <Continuous>  pantoprazole    Tablet 40 milliGRAM(s) Oral before breakfast  senna 2 Tablet(s) Oral at bedtime  docusate sodium 100 milliGRAM(s) Oral three times a day  celecoxib 200 milliGRAM(s) Oral two times a day before meals  aspirin enteric coated 162 milliGRAM(s) Oral every 12 hours  acetaminophen   Tablet 1000 milliGRAM(s) Oral every 8 hours  simvastatin 80 milliGRAM(s) Oral at bedtime    MEDICATIONS  (PRN):  aluminum hydroxide/magnesium hydroxide/simethicone Suspension 30 milliLiter(s) Oral four times a day PRN Indigestion  ondansetron Injectable 4 milliGRAM(s) IV Push every 6 hours PRN Nausea and/or Vomiting  magnesium hydroxide Suspension 30 milliLiter(s) Oral daily PRN Constipation  polyethylene glycol 3350 17 Gram(s) Oral daily PRN Constipation  HYDROmorphone  Injectable 0.5 milliGRAM(s) IV Push every 3 hours PRN Severe Pain (7 - 10)  traMADol 50 milliGRAM(s) Oral every 4 hours PRN Mild Pain (1 - 3)  traMADol 100 milliGRAM(s) Oral every 6 hours PRN Moderate Pain (4 - 6)        Vital Signs Last 24 Hrs  T(C): 36.4 (11 Jul 2017 11:11), Max: 36.7 (11 Jul 2017 07:53)  T(F): 97.6 (11 Jul 2017 11:11), Max: 98 (11 Jul 2017 07:53)  HR: 69 (11 Jul 2017 11:11) (55 - 73)  BP: 119/67 (11 Jul 2017 11:11) (98/56 - 143/63)  BP(mean): --  RR: 16 (11 Jul 2017 11:11) (12 - 18)  SpO2: 100% (11 Jul 2017 11:11) (99% - 100%)      PHYSICAL EXAM-  GENERAL: NAD, well-groomed, well-developed  HEAD:  Atraumatic, Normocephalic  EYES: EOMI, PERRLA, conjunctiva and sclera clear  NECK: Supple, No JVD, Normal thyroid  NERVOUS SYSTEM:  Alert & Oriented X3, Motor Strength 5/5 B/L upper and lower extremities; DTRs 2+ intact and symmetric  CHEST/LUNG: Clear to percussion bilaterally; No rales, rhonchi, wheezing, or rubs  HEART: Regular rate and rhythm; No murmurs, rubs, or gallops  ABDOMEN: Soft, Nontender, Nondistended; Bowel sounds present  EXTREMITIES:  2+ Peripheral Pulses, No clubbing, cyanosis, or edema  SKIN: No rashes or lesions                            11.8   11.0  )-----------( 202      ( 11 Jul 2017 07:20 )             35.2     07-11    140  |  107  |  15  ----------------------------<  123<H>  5.2   |  26  |  0.98    Ca    8.8      11 Jul 2017 07:20 (E4) spontaneous

## 2023-06-08 ENCOUNTER — OFFICE (OUTPATIENT)
Dept: URBAN - METROPOLITAN AREA CLINIC 35 | Facility: CLINIC | Age: 78
Setting detail: OPHTHALMOLOGY
End: 2023-06-08
Payer: MEDICARE

## 2023-06-08 DIAGNOSIS — H02.834: ICD-10-CM

## 2023-06-08 DIAGNOSIS — H01.001: ICD-10-CM

## 2023-06-08 DIAGNOSIS — H02.831: ICD-10-CM

## 2023-06-08 DIAGNOSIS — H01.004: ICD-10-CM

## 2023-06-08 DIAGNOSIS — H25.013: ICD-10-CM

## 2023-06-08 DIAGNOSIS — H25.13: ICD-10-CM

## 2023-06-08 PROCEDURE — 92014 COMPRE OPH EXAM EST PT 1/>: CPT | Performed by: OPHTHALMOLOGY

## 2023-06-08 ASSESSMENT — LID EXAM ASSESSMENTS
OS_BLEPHARITIS: LUL 1+
OD_BLEPHARITIS: RUL 1+

## 2023-06-08 ASSESSMENT — SPHEQUIV_DERIVED
OD_SPHEQUIV: 1.875
OS_SPHEQUIV: 2.875
OS_SPHEQUIV: 3
OD_SPHEQUIV: 2.375
OD_SPHEQUIV: 1.75
OS_SPHEQUIV: 2.875
OD_SPHEQUIV: 1.5
OD_SPHEQUIV: 2.25

## 2023-06-08 ASSESSMENT — REFRACTION_MANIFEST
OD_VA1: 20/25-1
OS_CYLINDER: SPHERE
OS_ADD: +2.50
OD_ADD: +2.50
OS_CYLINDER: -0.25
OS_VA1: 20/25-
OD_AXIS: 020
OS_SPHERE: +3.00
OD_VA1: 20/20-
OD_SPHERE: +2.25
OS_SPHERE: +3.00
OS_ADD: +2.50
OS_SPHERE: +3.25
OS_AXIS: 150
OS_VA1: 20/25-
OD_AXIS: 015
OS_SPHERE: +2.50
OS_VA1: 20/25+2
OD_CYLINDER: -0.75
OU_VA: 20/25+2
OD_AXIS: 025
OD_SPHERE: +2.75
OS_CYLINDER: -0.50
OD_SPHERE: +2.25
OD_CYLINDER: -1.00
OD_CYLINDER: -0.75
OS_CYLINDER: SPHERE
OS_AXIS: 135
OD_CYLINDER: -1.00
OD_CYLINDER: SPHERE
OD_VA1: 20/20-2
OD_SPHERE: +2.00
OS_VA1: 20/25
OD_ADD: +2.50
OD_VA1: 20/20
OD_AXIS: 025
OS_VA1: 20/25
OD_VA1: 20/25-1
OS_SPHERE: +2.25
OD_SPHERE: +2.25

## 2023-06-08 ASSESSMENT — VISUAL ACUITY
OD_BCVA: 20/25+2
OS_BCVA: 20/20-2

## 2023-06-08 ASSESSMENT — REFRACTION_AUTOREFRACTION
OD_SPHERE: +2.75
OS_SPHERE: +3.00
OD_CYLINDER: -1.00
OS_AXIS: 164
OD_AXIS: 026
OS_CYLINDER: -0.25

## 2023-06-08 ASSESSMENT — AXIALLENGTH_DERIVED
OD_AL: 22.7441
OS_AL: 22.6912
OD_AL: 22.9266
OD_AL: 23.0654
OD_AL: 22.9727
OS_AL: 22.6912
OS_AL: 22.6462
OD_AL: 22.7895

## 2023-06-08 ASSESSMENT — REFRACTION_CURRENTRX
OD_VPRISM_DIRECTION: PROGS
OS_CYLINDER: -0.25
OS_VPRISM_DIRECTION: PROGS
OD_SPHERE: +2.25
OS_OVR_VA: 20/
OS_AXIS: 147
OD_CYLINDER: -0.25
OD_ADD: +2.50
OD_OVR_VA: 20/
OS_ADD: +2.50
OD_AXIS: 009
OS_SPHERE: +2.25

## 2023-06-08 ASSESSMENT — KERATOMETRY
OD_K2POWER_DIOPTERS: 44.00
OS_AXISANGLE_DEGREES: 063
METHOD_AUTO_MANUAL: AUTO
OD_AXISANGLE_DEGREES: 103
OS_K2POWER_DIOPTERS: 43.50
OD_K1POWER_DIOPTERS: 42.75
OS_K1POWER_DIOPTERS: 42.50

## 2023-06-08 ASSESSMENT — CONFRONTATIONAL VISUAL FIELD TEST (CVF)
OS_FINDINGS: FULL
OD_FINDINGS: FULL

## 2023-06-08 ASSESSMENT — LID POSITION - DERMATOCHALASIS
OS_DERMATOCHALASIS: LUL 2+
OD_DERMATOCHALASIS: RUL 2+

## 2023-06-08 ASSESSMENT — TONOMETRY
OD_IOP_MMHG: 17
OS_IOP_MMHG: 16

## 2024-06-11 ENCOUNTER — OFFICE (OUTPATIENT)
Dept: URBAN - METROPOLITAN AREA CLINIC 35 | Facility: CLINIC | Age: 79
Setting detail: OPHTHALMOLOGY
End: 2024-06-11
Payer: MEDICARE

## 2024-06-11 DIAGNOSIS — H02.834: ICD-10-CM

## 2024-06-11 DIAGNOSIS — H25.13: ICD-10-CM

## 2024-06-11 DIAGNOSIS — H25.013: ICD-10-CM

## 2024-06-11 DIAGNOSIS — H52.4: ICD-10-CM

## 2024-06-11 DIAGNOSIS — H52.03: ICD-10-CM

## 2024-06-11 DIAGNOSIS — H02.831: ICD-10-CM

## 2024-06-11 DIAGNOSIS — H01.004: ICD-10-CM

## 2024-06-11 DIAGNOSIS — H01.001: ICD-10-CM

## 2024-06-11 PROCEDURE — 92014 COMPRE OPH EXAM EST PT 1/>: CPT | Performed by: OPHTHALMOLOGY

## 2024-06-11 PROCEDURE — 92015 DETERMINE REFRACTIVE STATE: CPT | Performed by: OPHTHALMOLOGY

## 2024-06-11 ASSESSMENT — LID POSITION - DERMATOCHALASIS
OD_DERMATOCHALASIS: RUL 2+
OS_DERMATOCHALASIS: LUL 2+

## 2024-06-11 ASSESSMENT — CONFRONTATIONAL VISUAL FIELD TEST (CVF)
OS_FINDINGS: FULL
OD_FINDINGS: FULL

## 2024-06-11 ASSESSMENT — LID EXAM ASSESSMENTS
OD_BLEPHARITIS: RUL 1+
OS_BLEPHARITIS: LUL 1+

## 2024-12-19 ENCOUNTER — DOCTOR'S OFFICE (OUTPATIENT)
Facility: LOCATION | Age: 79
Setting detail: OPHTHALMOLOGY
End: 2024-12-19
Payer: MEDICARE

## 2024-12-19 DIAGNOSIS — H01.004: ICD-10-CM

## 2024-12-19 DIAGNOSIS — H02.89: ICD-10-CM
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
## 2024-12-19 DIAGNOSIS — H01.001: ICD-10-CM

## 2024-12-19 DIAGNOSIS — S05.02XA: ICD-10-CM

## 2024-12-19 PROCEDURE — 55555 MISCELLANEOUS CHARGES: CPT | Performed by: OPHTHALMOLOGY

## 2024-12-19 PROCEDURE — 92012 INTRM OPH EXAM EST PATIENT: CPT | Performed by: OPHTHALMOLOGY

## 2024-12-19 ASSESSMENT — REFRACTION_MANIFEST
OS_AXIS: 150
OD_VA1: 20/20-
OS_CYLINDER: SPHERE
OD_VA1: 20/20-2
OD_VA1: 20/20-3
OD_AXIS: 020
OS_SPHERE: +2.25
OD_VA1: 20/25-1
OD_AXIS: 035
OD_SPHERE: +2.25
OD_CYLINDER: -0.50
OD_CYLINDER: SPHERE
OS_VA1: 20/25+2
OD_VA1: 20/20
OS_CYLINDER: SPHERE
OD_ADD: +2.50
OS_CYLINDER: SPHERE
OS_AXIS: 135
OD_CYLINDER: -0.75
OS_VA1: 20/30-1
OS_VA1: 20/25-2
OD_SPHERE: +2.75
OS_CYLINDER: -0.50
OD_AXIS: 025
OS_SPHERE: +3.00
OD_SPHERE: +2.00
OS_VA1: 20/25-
OD_SPHERE: +2.25
OS_SPHERE: +3.00
OS_SPHERE: +3.00
OD_SPHERE: +2.25
OD_AXIS: 015
OS_CYLINDER: -0.25
OS_SPHERE: +2.25
OD_SPHERE: +2.25
OS_AXIS: 150
OD_CYLINDER: -0.50
OD_VA1: 20/25-2
OS_SPHERE: +2.50
OS_ADD: +2.50
OS_ADD: +2.50
OS_SPHERE: +3.25
OS_VA1: 20/25
OS_ADD: +2.50
OD_ADD: +2.50
OD_AXIS: 040
OD_AXIS: 025
OD_CYLINDER: -1.00
OD_CYLINDER: -0.75
OD_ADD: +2.50
OD_CYLINDER: -1.00
OS_CYLINDER: -1.00
OU_VA: 20/25+2
OS_VA1: 20/25
OD_SPHERE: +2.50
OS_VA1: 20/25-
OD_VA1: 20/25-1

## 2024-12-19 ASSESSMENT — CORNEAL TRAUMA - ABRASION: OS_ABRASION: PRESENT

## 2024-12-19 ASSESSMENT — LID EXAM ASSESSMENTS
OS_MEIBOMITIS: LLL 1+
OD_MEIBOMITIS: RLL 1+
OS_BLEPHARITIS: LUL 1+
OD_BLEPHARITIS: RUL 1+

## 2024-12-19 ASSESSMENT — REFRACTION_CURRENTRX
OS_CYLINDER: -0.25
OS_AXIS: 169
OS_SPHERE: +1.75
OD_VPRISM_DIRECTION: PROGS
OD_CYLINDER: -0.25
OD_AXIS: 027
OD_CYLINDER: -0.25
OS_VPRISM_DIRECTION: PROGS
OS_VPRISM_DIRECTION: PROGS
OD_SPHERE: +2.25
OD_SPHERE: +1.75
OS_CYLINDER: -0.25
OD_OVR_VA: 20/
OS_OVR_VA: 20/
OD_ADD: +2.50
OS_AXIS: 147
OD_OVR_VA: 20/
OS_ADD: +2.50
OD_VPRISM_DIRECTION: PROGS
OS_SPHERE: +2.25
OD_ADD: +2.50
OD_AXIS: 009
OS_ADD: +2.50
OS_OVR_VA: 20/

## 2024-12-19 ASSESSMENT — CONFRONTATIONAL VISUAL FIELD TEST (CVF)
OD_FINDINGS: FULL
OS_FINDINGS: FULL

## 2024-12-19 ASSESSMENT — VISUAL ACUITY
OS_BCVA: 20/20-1
OD_BCVA: 20/30

## 2024-12-19 ASSESSMENT — KERATOMETRY: METHOD_AUTO_MANUAL: AUTO

## 2024-12-19 ASSESSMENT — LID POSITION - DERMATOCHALASIS
OS_DERMATOCHALASIS: LUL 2+
OD_DERMATOCHALASIS: RUL 2+

## 2025-01-02 ENCOUNTER — DOCTOR'S OFFICE (OUTPATIENT)
Facility: LOCATION | Age: 80
Setting detail: OPHTHALMOLOGY
End: 2025-01-02
Payer: MEDICARE

## 2025-01-02 DIAGNOSIS — H02.89: ICD-10-CM

## 2025-01-02 DIAGNOSIS — H00.15: ICD-10-CM

## 2025-01-02 DIAGNOSIS — H16.221: ICD-10-CM

## 2025-01-02 DIAGNOSIS — H01.001: ICD-10-CM

## 2025-01-02 DIAGNOSIS — S05.02XD: ICD-10-CM

## 2025-01-02 DIAGNOSIS — S05.02XA: ICD-10-CM

## 2025-01-02 DIAGNOSIS — H01.004: ICD-10-CM

## 2025-01-02 PROCEDURE — 99213 OFFICE O/P EST LOW 20 MIN: CPT | Performed by: OPHTHALMOLOGY

## 2025-01-02 ASSESSMENT — REFRACTION_MANIFEST
OD_AXIS: 025
OS_SPHERE: +3.00
OS_VA1: 20/25
OD_SPHERE: +2.25
OD_VA1: 20/25-2
OD_AXIS: 035
OS_AXIS: 150
OD_SPHERE: +2.75
OD_VA1: 20/20-2
OD_SPHERE: +2.50
OD_AXIS: 015
OS_CYLINDER: SPHERE
OD_CYLINDER: -0.75
OS_ADD: +2.50
OS_CYLINDER: -1.00
OU_VA: 20/25+2
OD_CYLINDER: SPHERE
OD_SPHERE: +2.25
OD_AXIS: 020
OS_VA1: 20/25-2
OD_CYLINDER: -0.50
OD_CYLINDER: -1.00
OS_VA1: 20/25+2
OD_AXIS: 025
OS_CYLINDER: SPHERE
OS_SPHERE: +3.00
OS_SPHERE: +2.25
OD_VA1: 20/20
OS_AXIS: 150
OS_VA1: 20/25-
OD_ADD: +2.50
OD_VA1: 20/25-1
OS_SPHERE: +2.25
OS_VA1: 20/25
OD_SPHERE: +2.25
OS_VA1: 20/30-1
OS_VA1: 20/25-
OS_CYLINDER: SPHERE
OD_VA1: 20/20-
OD_CYLINDER: -1.00
OD_AXIS: 040
OS_SPHERE: +3.25
OS_AXIS: 135
OD_VA1: 20/20-3
OS_SPHERE: +3.00
OD_SPHERE: +2.00
OS_CYLINDER: -0.25
OS_SPHERE: +2.50
OD_VA1: 20/25-1
OD_ADD: +2.50
OD_ADD: +2.50
OD_SPHERE: +2.25
OS_CYLINDER: -0.50
OD_CYLINDER: -0.75
OS_ADD: +2.50
OS_ADD: +2.50
OD_CYLINDER: -0.50

## 2025-01-02 ASSESSMENT — REFRACTION_CURRENTRX
OD_CYLINDER: -0.25
OS_AXIS: 169
OD_AXIS: 009
OD_SPHERE: +1.75
OS_AXIS: 147
OS_VPRISM_DIRECTION: PROGS
OS_ADD: +2.50
OD_VPRISM_DIRECTION: PROGS
OD_SPHERE: +2.25
OD_CYLINDER: -0.25
OD_AXIS: 027
OS_CYLINDER: -0.25
OD_OVR_VA: 20/
OS_CYLINDER: -0.25
OS_VPRISM_DIRECTION: PROGS
OD_VPRISM_DIRECTION: PROGS
OD_OVR_VA: 20/
OD_ADD: +2.50
OD_ADD: +2.50
OS_OVR_VA: 20/
OS_OVR_VA: 20/
OS_SPHERE: +2.25
OS_SPHERE: +1.75
OS_ADD: +2.50

## 2025-01-02 ASSESSMENT — DRY EYES - PHYSICIAN NOTES: OD_GENERALCOMMENTS: CENTRAL

## 2025-01-02 ASSESSMENT — REFRACTION_AUTOREFRACTION
OS_CYLINDER: -0.25
OD_CYLINDER: -0.50
OD_SPHERE: +2.25
OS_SPHERE: +3.00
OD_AXIS: 039
OS_AXIS: 061

## 2025-01-02 ASSESSMENT — LID EXAM ASSESSMENTS
OS_BLEPHARITIS: LUL 1+
OD_MEIBOMITIS: RLL 1+
OS_MEIBOMITIS: LLL 1+
OD_BLEPHARITIS: RUL 1+

## 2025-01-02 ASSESSMENT — LID POSITION - DERMATOCHALASIS
OD_DERMATOCHALASIS: RUL 2+
OS_DERMATOCHALASIS: LUL 2+

## 2025-01-02 ASSESSMENT — KERATOMETRY
OS_K2POWER_DIOPTERS: 43.25
OD_AXISANGLE_DEGREES: 110
OS_AXISANGLE_DEGREES: 079
METHOD_AUTO_MANUAL: AUTO
OS_K1POWER_DIOPTERS: 43.00
OD_K1POWER_DIOPTERS: 43.25
OD_K2POWER_DIOPTERS: 44.75

## 2025-01-02 ASSESSMENT — VISUAL ACUITY
OD_BCVA: 20/30-1
OS_BCVA: 20/20-1

## 2025-01-02 ASSESSMENT — CORNEAL TRAUMA - ABRASION
OD_ABRASION: PRESENT
OS_ABRASION: ABSENT

## 2025-01-02 ASSESSMENT — SUPERFICIAL PUNCTATE KERATITIS (SPK): OD_SPK: 1+

## 2025-01-02 ASSESSMENT — CONFRONTATIONAL VISUAL FIELD TEST (CVF)
OD_FINDINGS: FULL
OS_FINDINGS: FULL

## 2025-05-02 ENCOUNTER — NON-APPOINTMENT (OUTPATIENT)
Age: 80
End: 2025-05-02

## 2025-08-08 ENCOUNTER — NON-APPOINTMENT (OUTPATIENT)
Age: 80
End: 2025-08-08